# Patient Record
Sex: FEMALE | Race: BLACK OR AFRICAN AMERICAN | NOT HISPANIC OR LATINO | Employment: UNEMPLOYED | URBAN - METROPOLITAN AREA
[De-identification: names, ages, dates, MRNs, and addresses within clinical notes are randomized per-mention and may not be internally consistent; named-entity substitution may affect disease eponyms.]

---

## 2017-01-25 ENCOUNTER — OFFICE VISIT (OUTPATIENT)
Dept: HEMATOLOGY/ONCOLOGY | Facility: CLINIC | Age: 72
End: 2017-01-25
Payer: MEDICARE

## 2017-01-25 ENCOUNTER — LAB VISIT (OUTPATIENT)
Dept: LAB | Facility: HOSPITAL | Age: 72
End: 2017-01-25
Attending: INTERNAL MEDICINE
Payer: MEDICARE

## 2017-01-25 VITALS
HEART RATE: 63 BPM | TEMPERATURE: 98 F | HEIGHT: 64 IN | SYSTOLIC BLOOD PRESSURE: 175 MMHG | BODY MASS INDEX: 48.03 KG/M2 | RESPIRATION RATE: 16 BRPM | WEIGHT: 281.31 LBS | DIASTOLIC BLOOD PRESSURE: 79 MMHG

## 2017-01-25 DIAGNOSIS — M81.0 OSTEOPOROSIS: Primary | ICD-10-CM

## 2017-01-25 DIAGNOSIS — I10 ESSENTIAL HYPERTENSION: ICD-10-CM

## 2017-01-25 DIAGNOSIS — M81.0 OSTEOPOROSIS: ICD-10-CM

## 2017-01-25 DIAGNOSIS — Z82.49 FAMILY HISTORY OF PREMATURE CORONARY ARTERY DISEASE: ICD-10-CM

## 2017-01-25 DIAGNOSIS — Z86.69 HISTORY OF GUILLAIN-BARRE SYNDROME: ICD-10-CM

## 2017-01-25 LAB
ALBUMIN SERPL BCP-MCNC: 3.6 G/DL
ALP SERPL-CCNC: 82 U/L
ALT SERPL W/O P-5'-P-CCNC: 16 U/L
ANION GAP SERPL CALC-SCNC: 10 MMOL/L
AST SERPL-CCNC: 21 U/L
BILIRUB SERPL-MCNC: 0.8 MG/DL
BUN SERPL-MCNC: 11 MG/DL
CALCIUM SERPL-MCNC: 9.8 MG/DL
CHLORIDE SERPL-SCNC: 108 MMOL/L
CO2 SERPL-SCNC: 28 MMOL/L
CREAT SERPL-MCNC: 0.7 MG/DL
EST. GFR  (AFRICAN AMERICAN): >60 ML/MIN/1.73 M^2
EST. GFR  (NON AFRICAN AMERICAN): >60 ML/MIN/1.73 M^2
GLUCOSE SERPL-MCNC: 129 MG/DL
POTASSIUM SERPL-SCNC: 3.8 MMOL/L
PROT SERPL-MCNC: 7 G/DL
SODIUM SERPL-SCNC: 146 MMOL/L

## 2017-01-25 PROCEDURE — 99213 OFFICE O/P EST LOW 20 MIN: CPT | Mod: PBBFAC,PO | Performed by: INTERNAL MEDICINE

## 2017-01-25 PROCEDURE — 80053 COMPREHEN METABOLIC PANEL: CPT

## 2017-01-25 PROCEDURE — 99204 OFFICE O/P NEW MOD 45 MIN: CPT | Mod: S$PBB,,, | Performed by: INTERNAL MEDICINE

## 2017-01-25 PROCEDURE — 36415 COLL VENOUS BLD VENIPUNCTURE: CPT

## 2017-01-25 PROCEDURE — 99999 PR PBB SHADOW E&M-EST. PATIENT-LVL III: CPT | Mod: PBBFAC,,, | Performed by: INTERNAL MEDICINE

## 2017-01-25 NOTE — MR AVS SNAPSHOT
[place] [facility]  [address] [place] [address] Drive [address]  [place] [address]  Phone: [phone]                  Sarai Bashir   2017 2:00 PM   Office Visit    Description:  Female : 1945   Provider:  Marika Edwards MD   Department:  [place] [facility]           Reason for Visit     Osteoporosis           Diagnoses this Visit        Comments    Osteoporosis    -  Primary     History of Guillain-[place] syndrome         Family history of premature coronary artery disease         Essential hypertension                To Do List           Future Appointments        Provider Department Dept Phone    2017 10:00 AM Alex Hernandez MD [facility] - Cardiology [phone]    3/13/2017 8:00 AM Alex Green Jr., MD [place] [facility] [phone]    2017 8:30 AM MD [facility] - Endo/Diabetes [phone]    2017 1:00 PM Marika Edwards MD [place] [facility] [phone]      Goals (5 Years of Data)     None      [facility]     [facility] On Call Nurse Care Line - 24/7 Assistance  Registered nurses in the [facility] On Call Center provide clinical advisement, health education, appointment booking, and other advisory services.  Call for this free service at [phone].             Medications           Message regarding Medications     Verify the changes and/or additions to your medication regime listed below are the same as discussed with your clinician today.  If any of these changes or additions are incorrect, please notify your healthcare provider.             Verify that the below list of medications is an accurate representation of the medications you are currently taking.  If none reported, the list may be blank. If incorrect, please contact your healthcare provider. Carry this list with you in case of emergency.           Current Medications     ascorbic acid (VITAMIN C) 500 MG tablet Take 500 mg by mouth once daily.   "    aspirin 81 mg Tab Take by mouth Daily. Every day    atorvastatin (LIPITOR) 20 MG tablet Take 1 tablet (20 mg total) by mouth once daily.    baclofen (LIORESAL) 10 MG tablet Take 1 tablet (10 mg total) by mouth 2 (two) times daily. Prn muscle spasm    brinzolamide (AZOPT) 1 % ophthalmic suspension 1 drop 2 (two) times daily.    BROMFENAC SODIUM (PROLENSA OPHT) Apply to eye 2 (two) times daily.    CALCIUM CARBONATE/VITAMIN D3 (VITAMIN D-3 ORAL) Take 2,000 Int'l Units by mouth.    CRANBERRY EXTRACT ORAL Take 1 capsule by mouth once daily.    cyanocobalamin (VITAMIN B-12) 500 MCG tablet 1 Tablet(s) Oral PRN Every other day.      econazole nitrate 1 % cream Apply to both soles and interdigital spaces BID    fish oil-omega-3 fatty acids 300-1,000 mg capsule Take 2 g by mouth once daily.    FLAXSEED ORAL Take by mouth.    folic acid (FOLVITE) 800 MCG tablet Take by mouth Daily. Every day    loteprednol (LOTEMAX) 0.5 % ophthalmic suspension 1 drop 4 (four) times daily.    metoprolol tartrate (LOPRESSOR) 50 MG tablet Take 1 tablet (50 mg total) by mouth 2 (two) times daily.    naproxen (EC-NAPROSYN) 500 MG EC tablet Take 1 tablet (500 mg total) by mouth 2 (two) times daily with meals.    tramadol (ULTRAM) 50 mg tablet Take 50 mg by mouth as needed for Pain.    VITAMIN B COMP AND VIT C NO.6 (VITAMIN B COMP WITH VIT C NO.6 ORAL) No Sig Provided    vitamin E 400 UNIT capsule Take 400 Units by mouth once daily.      amlodipine (NORVASC) 5 MG tablet Take 1 tablet (5 mg total) by mouth once daily.    oxybutynin (DITROPAN-XL) 5 MG TR24 Take 1 tablet (5 mg total) by mouth once daily. For overactive bladder           Clinical Reference Information           Vital Signs - Last Recorded  Most recent update: 1/25/2017  2:19 PM by Bree Almanza LPN    BP Pulse Temp Resp Ht Wt    (!) 175/79 63 98.1 °F (36.7 °C) 16 5' 4" (1.626 m) 127.6 kg (281 lb 4.9 oz)    BMI                48.29 kg/m2          Blood Pressure          Most Recent " Value    BP  (!)  175/79      Allergies as of 1/25/2017     Actonel [Risedronate]    Fosamax [Alendronate]      Immunizations Administered on Date of Encounter - 1/25/2017     None      [facility] Sign-Up     Activating your [facility] account is as easy as 1-2-3!     1) Visit [email or web address], select Sign Up Now, enter this activation code and your date of birth, then select Next.  [email or web address]  Expires: 1/26/2017  8:46 AM      2) Create a username and password to use when you visit [facility] in the future and select a security question in case you lose your password and select Next.    3) Enter your e-mail address and click Sign Up!    Additional Information  If you have questions, please e-mail [email or web address] or call [phone] to talk to our [facility] staff. Remember, [facility] is NOT to be used for urgent needs. For medical emergencies, dial 911.

## 2017-01-25 NOTE — PROGRESS NOTES
Subjective:       Patient ID: Sarai Bashir is a 71 y.o. female.    Chief Complaint: Osteoporosis (clear for prolia)    HPI:   Dx with osteoporosis, CVA, hx Guillian Keno dx in 7 years ago, follows with pcp only , was hospitalized almost 1 month with [facility]. ? Pheresis was planned.  CVA, had some cardiac exams and normal.taking cholesterol meds ,a nd has HTN    REVIEW OF SYSTEMS:     CONSTITUTIONAL: The patient denies any weight change. There is no apparent    change in appetite, fever, night sweats, headaches, fatigue, dizziness, or    weakness.      SKIN: Denies rash, issues with nails, non-healing sores, bleeding, blotching    skin or abnormal bruising. Denies new moles or changes to existing moles.      BREASTS: There is no swelling around breasts or nipple discharge.    EYES: Denies eye pain, blurred vision, swelling, redness or discharge.      ENT AND MOUTH:has abscess to teeth, delayed her prolia that waas suggestion  CARDIOVASCULAR: Denies chest pain, discomfort or palpitations. Denies neck    swelling or episodes of passing out.      RESPIRATORY: Denies cough, sputum production, blood in sputum, and denies    shortness of breath.      GI: Denies trouble swallowing, indigestion, heartburn, abdominal pain, nausea,    vomiting, diarrhea, altered bowel habits, blood in stool, discoloration of    stools, change in nature of stool, bloating, increased abdominal girth.      GENITOURINARY: No discharge. No pelvic pain or lumps. No rash around groin or  lesions. No urinary frequency, hesitation, painful urination or blood in    urine. Denies incontinence. No problems with intercourse.      MUSCULOSKELETAL: Denies neck or back pain. Denies weakness in arms or legs,    joint problems or distended inflamed veins in legs. Denies swelling or abnormal  glands.      NEUROLOGICAL: Denies tingling, numbness, altered mentation changes to nerve    function in the face, weakness to one or both of the body. Denies changes to     gait and denies multiple falls or accidents.      PHYSICAL EXAM:     Vitals:    01/25/17 1418   BP: (!) 175/79   Pulse: 63   Resp: 16   Temp: 98.1 °F (36.7 °C)       GENERAL: Comfortable looking patient. Patient is in no distress.  Awake, alert and oriented to time, person and place.  No anxiety, or agitation.      HEENT: Normal conjunctivae and eyelids. WNL.  PERRLA 3 to 4 mm. No icterus, no pallor, no congestion, and no discharge noted.     NECK:  Supple. Trachea is central.  No crepitus.  No JVD or masses.    RESPIRATORY:  No intercostal retractions.  No dullness to percussion.  Chest is clear to auscultation.  No rales, rhonchi or wheezes.  No crepitus.  Good air entry bilaterally.    CARDIOVASCULAR:  S1 and S2 are normally heard without murmurs or gallops.  All peripheral pulses are present.    ABDOMEN:  Normal abdomen.  No hepatosplenomegaly.  No free fluid.  Bowel sounds are present.  No hernia noted. No masses.  No rebound or tenderness.  No guarding or rigidity.  Umbilicus is midline.    LYMPHATICS:  No axillary, cervical, supraclavicular, submental, or inguinal lymphadenopathy.    SKIN/MUSCULOSKELETAL:  There is no evidence of excoriation marks or ecchmosis.  No rashes.  No cyanosis.  No clubbing.  No joint or skeletal deformities noted.  Normal range of motion.    NEUROLOGIC:  Higher functions are appropriate.  No cranial nerve deficits.  Normal denis.  Normal strength.  Motor and sensory functions are normal.  Deep tendon reflexes are normal.    GENITAL/RECTAL:  Exams are deferred.      Laboratory:     CBC:  Lab Results   Component Value Date    WBC 9.10 08/06/2016    RBC 4.50 08/06/2016    HGB 12.7 08/06/2016    HCT 38.4 08/06/2016    MCV 85 08/06/2016    MCH 28.2 08/06/2016    MCHC 33.0 08/06/2016    RDW 15.1 (H) 08/06/2016     08/06/2016    MPV 10.3 08/06/2016    GRAN 6.6 08/06/2016    GRAN 72.4 08/06/2016    LYMPH 1.9 08/06/2016    LYMPH 21.1 08/06/2016    MONO 0.5 08/06/2016    MONO 5.5  08/06/2016    EOS 0.1 08/06/2016    BASO 0.00 08/06/2016    EOSINOPHIL 0.7 08/06/2016    BASOPHIL 0.3 08/06/2016       BMP: BMP  Lab Results   Component Value Date     (H) 01/25/2017    K 3.8 01/25/2017     01/25/2017    CO2 28 01/25/2017    BUN 11 01/25/2017    CREATININE 0.7 01/25/2017    CALCIUM 9.8 01/25/2017    ANIONGAP 10 01/25/2017    ESTGFRAFRICA >60 01/25/2017    EGFRNONAA >60 01/25/2017       LFT:   Lab Results   Component Value Date    ALT 16 01/25/2017    AST 21 01/25/2017    ALKPHOS 82 01/25/2017    BILITOT 0.8 01/25/2017         Assessment/Plan:       1. Osteoporosis    2. History of Guillain-[place] syndrome    3. Family history of premature coronary artery disease    4. Essential hypertension        Will hold off prolia till cleared from dental . rtc4 months if dental work is completed, please call pt to check on when dental work is being complteed when preparing chart for next visit

## 2017-01-25 NOTE — LETTER
January 25, 2017      Saeed Middleton MD  [address]  [facility] [address]           [facility]  [address] [place] [address] Drive [address]  [facility] [address]  Phone: [phone]          Patient: Sarai Bashir   MR Number: [id number]   YOB: 1945   Date of Visit: 1/25/2017       Dear Dr. Saeed Middleton:    Thank you for referring Sarai Bashir to me for evaluation. Attached you will find relevant portions of my assessment and plan of care.    If you have questions, please do not hesitate to call me. I look forward to following Sarai Bashir along with you.    Sincerely,    Marika Edwards MD    Enclosure  CC:  No Recipients    If you would like to receive this communication electronically, please contact [email or web address] or [phone] to request more information on [employer] Link access.    For providers and/or their staff who would like to refer a patient to [facility], please contact us through our one-stop-shop provider referral line, [facility], [place], at [phone].    If you feel you have received this communication in error or would no longer like to receive these types of communications, please e-mail [email or web address]

## 2017-01-26 DIAGNOSIS — M81.0 OSTEOPOROSIS: Primary | ICD-10-CM

## 2017-01-31 ENCOUNTER — OFFICE VISIT (OUTPATIENT)
Dept: CARDIOLOGY | Facility: CLINIC | Age: 72
End: 2017-01-31
Payer: MEDICARE

## 2017-01-31 VITALS
DIASTOLIC BLOOD PRESSURE: 74 MMHG | OXYGEN SATURATION: 99 % | HEART RATE: 63 BPM | SYSTOLIC BLOOD PRESSURE: 129 MMHG | BODY MASS INDEX: 48.32 KG/M2 | WEIGHT: 283.06 LBS | HEIGHT: 64 IN

## 2017-01-31 DIAGNOSIS — E78.00 PURE HYPERCHOLESTEROLEMIA: ICD-10-CM

## 2017-01-31 DIAGNOSIS — Z91.89 CARDIOVASCULAR RISK FACTOR: ICD-10-CM

## 2017-01-31 DIAGNOSIS — E66.01 MORBID OBESITY DUE TO EXCESS CALORIES: ICD-10-CM

## 2017-01-31 DIAGNOSIS — I63.132 CEREBRAL INFARCTION DUE TO EMBOLISM OF LEFT CAROTID ARTERY: Primary | ICD-10-CM

## 2017-01-31 PROCEDURE — 99999 PR PBB SHADOW E&M-EST. PATIENT-LVL III: CPT | Mod: PBBFAC,,, | Performed by: INTERNAL MEDICINE

## 2017-01-31 PROCEDURE — 99214 OFFICE O/P EST MOD 30 MIN: CPT | Mod: S$PBB,,, | Performed by: INTERNAL MEDICINE

## 2017-01-31 PROCEDURE — 99213 OFFICE O/P EST LOW 20 MIN: CPT | Mod: PBBFAC,PO | Performed by: INTERNAL MEDICINE

## 2017-01-31 RX ORDER — ATORVASTATIN CALCIUM 40 MG/1
40 TABLET, FILM COATED ORAL DAILY
Qty: 90 TABLET | Refills: 3 | Status: SHIPPED | OUTPATIENT
Start: 2017-01-31 | End: 2018-05-17 | Stop reason: SDUPTHER

## 2017-01-31 NOTE — MR AVS SNAPSHOT
[facility] - Cardiology  [address]  [facility] [address]  Phone: [phone]                  Sarai Bashir   2017 10:00 AM   Office Visit    Description:  Female : 1945   Provider:  Alex Hernandez MD   Department:  [facility] - Cardiology           Reason for Visit     Follow-up           Diagnoses this Visit        Comments    Cerebral infarction due to embolism of left carotid artery    -  Primary     Morbid obesity due to excess calories         Pure hypercholesterolemia         Cardiovascular risk factor                To Do List           Future Appointments        Provider Department Dept Phone    3/13/2017 8:00 AM MD [facility] - Family Medicine [phone]    2017 8:30 AM [facility] - Lab [phone]    2017 11:30 AM [facility] [phone]    2017 8:30 AM MD [facility] - Endo/Diabetes [phone]    2017 10:00 AM [facility] - Lab [phone]      Goals (5 Years of Data)     None      Follow-Up and Disposition     Return in about 3 months (around 2017) for lipid panel, hsCRP.    Follow-up and Disposition History       These Medications        Disp Refills Start End    atorvastatin (LIPITOR) 40 MG tablet 90 tablet 3 2017    Take 1 tablet (40 mg total) by mouth once daily. - Oral    Pharmacy: [facility] [address] #: [phone]         [facility] On Call     [facility] On Call Nurse Care Line -  Assistance  Registered nurses in the [facility] On Call Center provide clinical advisement, health education, appointment booking, and other advisory services.  Call for this free service at [phone].             Medications           Message regarding Medications     Verify the changes and/or additions to your medication regime listed below are the same as discussed with your  clinician today.  If any of these changes or additions are incorrect, please notify your healthcare provider.        CHANGE how you are taking these medications     Start Taking Instead of    atorvastatin (LIPITOR) 40 MG tablet atorvastatin (LIPITOR) 20 MG tablet    Dosage:  Take 1 tablet (40 mg total) by mouth once daily. Dosage:  Take 1 tablet (20 mg total) by mouth once daily.    Reason for Change:  Reorder            Verify that the below list of medications is an accurate representation of the medications you are currently taking.  If none reported, the list may be blank. If incorrect, please contact your healthcare provider. Carry this list with you in case of emergency.           Current Medications     ascorbic acid (VITAMIN C) 500 MG tablet Take 500 mg by mouth once daily.      aspirin 81 mg Tab Take by mouth Daily. Every day    atorvastatin (LIPITOR) 40 MG tablet Take 1 tablet (40 mg total) by mouth once daily.    brinzolamide (AZOPT) 1 % ophthalmic suspension 1 drop 2 (two) times daily.    BROMFENAC SODIUM (PROLENSA OPHT) Apply to eye 2 (two) times daily.    CALCIUM CARBONATE/VITAMIN D3 (VITAMIN D-3 ORAL) Take 2,000 Int'l Units by mouth.    CRANBERRY EXTRACT ORAL Take 1 capsule by mouth once daily.    cyanocobalamin (VITAMIN B-12) 500 MCG tablet 1 Tablet(s) Oral PRN Every other day.      econazole nitrate 1 % cream Apply to both soles and interdigital spaces BID    fish oil-omega-3 fatty acids 300-1,000 mg capsule Take 2 g by mouth once daily.    FLAXSEED ORAL Take by mouth.    folic acid (FOLVITE) 800 MCG tablet Take by mouth Daily. Every day    loteprednol (LOTEMAX) 0.5 % ophthalmic suspension 1 drop 4 (four) times daily.    metoprolol tartrate (LOPRESSOR) 50 MG tablet Take 1 tablet (50 mg total) by mouth 2 (two) times daily.    naproxen (EC-NAPROSYN) 500 MG EC tablet Take 1 tablet (500 mg total) by mouth 2 (two) times daily with meals.    VITAMIN B COMP AND VIT C NO.6 (VITAMIN B COMP WITH VIT C NO.6  "ORAL) No Sig Provided    vitamin E 400 UNIT capsule Take 400 Units by mouth once daily.      amlodipine (NORVASC) 5 MG tablet Take 1 tablet (5 mg total) by mouth once daily.    baclofen (LIORESAL) 10 MG tablet Take 1 tablet (10 mg total) by mouth 2 (two) times daily. Prn muscle spasm    oxybutynin (DITROPAN-XL) 5 MG TR24 Take 1 tablet (5 mg total) by mouth once daily. For overactive bladder    tramadol (ULTRAM) 50 mg tablet Take 50 mg by mouth as needed for Pain.           Clinical Reference Information           Vital Signs - Last Recorded  Most recent update: 1/31/2017 10:27 AM by Anentte Castillo LPN    BP Pulse Ht Wt SpO2 BMI    129/74 (BP Location: Left arm, Patient Position: Sitting) 63 5' 4" (1.626 m) 128.4 kg (283 lb 1.1 oz) 99% 48.59 kg/m2      Blood Pressure          Most Recent Value    Right Arm BP - Sitting  129/60    Left Arm BP - Sitting  129/74    BP  129/74      Allergies as of 1/31/2017     Actonel [Risedronate]    Fosamax [Alendronate]      Immunizations Administered on Date of Encounter - 1/31/2017     None      Orders Placed During Today's Visit     Future Labs/Procedures Expected by Expires    High sensitivity CRP (Cardiac CRP)  4/30/2017 1/31/2018    Lipid panel  4/30/2017 4/1/2018      [facility] Sign-Up     Activating your [facility] account is as easy as 1-2-3!     1) Visit [email or web address], select Sign Up Now, enter this activation code and your date of birth, then select Next.  [email or web address]  Expires: 3/17/2017 11:08 AM      2) Create a username and password to use when you visit [facility] in the future and select a security question in case you lose your password and select Next.    3) Enter your e-mail address and click Sign Up!    Additional Information  If you have questions, please e-mail [email or web address] or call [phone] to talk to our [facility] staff. Remember, [facility] is NOT to be used for urgent needs. For medical emergencies, dial 911.         "
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
## 2017-02-23 DIAGNOSIS — I10 HTN (HYPERTENSION): ICD-10-CM

## 2017-02-23 RX ORDER — METOPROLOL TARTRATE 50 MG/1
TABLET ORAL
Qty: 60 TABLET | Refills: 11 | Status: SHIPPED | OUTPATIENT
Start: 2017-02-23 | End: 2017-03-13 | Stop reason: SDUPTHER

## 2017-03-06 ENCOUNTER — DOCUMENTATION ONLY (OUTPATIENT)
Dept: FAMILY MEDICINE | Facility: CLINIC | Age: 72
End: 2017-03-06

## 2017-03-06 NOTE — PROGRESS NOTES
Pre-Visit Chart Review  For Appointment Scheduled on (date) 3/13/17    Health Maintenance Due   Topic Date Due    Hepatitis C Screening  1945    TETANUS VACCINE  03/23/1965    Pneumococcal (65+) (1 of 2 - PCV13) 03/23/2012    Influenza Vaccine  08/01/2016

## 2017-03-13 ENCOUNTER — OFFICE VISIT (OUTPATIENT)
Dept: FAMILY MEDICINE | Facility: CLINIC | Age: 72
End: 2017-03-13
Payer: MEDICARE

## 2017-03-13 VITALS
HEART RATE: 78 BPM | WEIGHT: 280.88 LBS | BODY MASS INDEX: 47.95 KG/M2 | DIASTOLIC BLOOD PRESSURE: 84 MMHG | HEIGHT: 64 IN | TEMPERATURE: 98 F | SYSTOLIC BLOOD PRESSURE: 144 MMHG

## 2017-03-13 DIAGNOSIS — Z01.419 WELL WOMAN EXAM: ICD-10-CM

## 2017-03-13 DIAGNOSIS — I10 ESSENTIAL HYPERTENSION: Primary | ICD-10-CM

## 2017-03-13 DIAGNOSIS — N32.81 OVERACTIVE BLADDER: ICD-10-CM

## 2017-03-13 DIAGNOSIS — E78.00 PURE HYPERCHOLESTEROLEMIA: ICD-10-CM

## 2017-03-13 DIAGNOSIS — G47.33 OSA (OBSTRUCTIVE SLEEP APNEA): ICD-10-CM

## 2017-03-13 DIAGNOSIS — E66.01 MORBID OBESITY DUE TO EXCESS CALORIES: ICD-10-CM

## 2017-03-13 PROCEDURE — 99213 OFFICE O/P EST LOW 20 MIN: CPT | Mod: S$PBB,,, | Performed by: FAMILY MEDICINE

## 2017-03-13 PROCEDURE — 99213 OFFICE O/P EST LOW 20 MIN: CPT | Mod: PBBFAC,PO | Performed by: FAMILY MEDICINE

## 2017-03-13 PROCEDURE — 99999 PR PBB SHADOW E&M-EST. PATIENT-LVL III: CPT | Mod: PBBFAC,,, | Performed by: FAMILY MEDICINE

## 2017-03-13 RX ORDER — NYSTATIN 100000 U/G
CREAM TOPICAL 2 TIMES DAILY
Qty: 30 G | Refills: 5 | Status: SHIPPED | OUTPATIENT
Start: 2017-03-13

## 2017-03-13 RX ORDER — AMLODIPINE BESYLATE 5 MG/1
5 TABLET ORAL DAILY
Qty: 90 TABLET | Refills: 3 | Status: SHIPPED | OUTPATIENT
Start: 2017-03-13 | End: 2017-07-14 | Stop reason: SDUPTHER

## 2017-03-13 RX ORDER — METOPROLOL TARTRATE 50 MG/1
50 TABLET ORAL 2 TIMES DAILY
Qty: 180 TABLET | Refills: 3 | Status: SHIPPED | OUTPATIENT
Start: 2017-03-13 | End: 2018-03-21 | Stop reason: SDUPTHER

## 2017-03-13 RX ORDER — OXYBUTYNIN CHLORIDE 5 MG/1
5 TABLET, EXTENDED RELEASE ORAL DAILY
Qty: 90 TABLET | Refills: 3 | Status: SHIPPED | OUTPATIENT
Start: 2017-03-13 | End: 2017-05-15 | Stop reason: SDUPTHER

## 2017-03-13 NOTE — MR AVS SNAPSHOT
[place] [facility]  [address]  [place] [address]  Phone: [phone]  Fax: [phone]                  Sarai Bashir   3/13/2017 8:00 AM   Office Visit    Description:  Female : 1945   Provider:  Alex Green Jr., MD   Department:  [facility]           Diagnoses this Visit        Comments    Essential hypertension    -  Primary     Pure hypercholesterolemia         Morbid obesity due to excess calories         KENTRELL (obstructive sleep apnea)         Overactive bladder         Well woman exam                To Do List           Future Appointments        Provider Department Dept Phone    2017 8:30 AM LAB, [facility] - [facility] [phone]    2017 11:30 AM Alex Hernandez MD [facility] - Cardiology [phone]    2017 10:40 AM MD [facility] - OB/ -[phone]    2017 8:30 AM Saeed Middleton MD [place] [facility]/Diabetes [phone]    2017 10:00 AM ELIAZAR, [facility] - Lab [phone]      Goals (5 Years of Data)     None      Follow-Up and Disposition     Return in about 6 months (around 2017).       These Medications        Disp Refills Start End    metoprolol tartrate (LOPRESSOR) 50 MG tablet 180 tablet 3 3/13/2017     Take 1 tablet (50 mg total) by mouth 2 (two) times daily. - Oral    Pharmacy: [facility] Ph #: [phone]       amlodipine (NORVASC) 5 MG tablet 90 tablet 3 3/13/2017 3/13/2018    Take 1 tablet (5 mg total) by mouth once daily. - Oral    Pharmacy: [facility] Ph #: [phone]       oxybutynin (DITROPAN-XL) 5 MG TR24 90 tablet 3 3/13/2017 3/13/2018    Take 1 tablet (5 mg total) by mouth once daily. For overactive bladder - Oral    [facility]: [phone]       nystatin (MYCOSTATIN) cream 30 g 5  3/13/2017     Apply topically 2 (two) times daily. - Topical (Top)    Pharmacy: [facility] [address] [place] [address], [place] [address]  #: [phone]         [facility] On Call     [facility] On Call Nurse Care Line - 24/7 Assistance  Registered nurses in the [facility] provide clinical advisement, health education, appointment booking, and other advisory services.  Call for this free service at [phone].             Medications           Message regarding Medications     Verify the changes and/or additions to your medication regime listed below are the same as discussed with your clinician today.  If any of these changes or additions are incorrect, please notify your healthcare provider.        START taking these NEW medications        Refills    nystatin (MYCOSTATIN) cream 5    Sig: Apply topically 2 (two) times daily.    Class: Normal    Route: Topical (Top)           Verify that the below list of medications is an accurate representation of the medications you are currently taking.  If none reported, the list may be blank. If incorrect, please contact your healthcare provider. Carry this list with you in case of emergency.           Current Medications     amlodipine (NORVASC) 5 MG tablet Take 1 tablet (5 mg total) by mouth once daily.    ascorbic acid (VITAMIN C) 500 MG tablet Take 500 mg by mouth once daily.      aspirin 81 mg Tab Take by mouth Daily. Every day    atorvastatin (LIPITOR) 40 MG tablet Take 1 tablet (40 mg total) by mouth once daily.    baclofen (LIORESAL) 10 MG tablet Take 1 tablet (10 mg total) by mouth 2 (two) times daily. Prn muscle spasm    brinzolamide (AZOPT) 1 % ophthalmic suspension 1 drop 2 (two) times daily.    BROMFENAC SODIUM (PROLENSA OPHT) Apply to eye 2 (two) times daily.    CALCIUM CARBONATE/VITAMIN D3 (VITAMIN D-3 ORAL) Take 2,000 Int'l Units by mouth.    CRANBERRY EXTRACT ORAL Take 1 capsule by mouth once daily.    cyanocobalamin (VITAMIN B-12) 500 MCG tablet 1  "Tablet(s) Oral PRN Every other day.      econazole nitrate 1 % cream Apply to both soles and interdigital spaces BID    fish oil-omega-3 fatty acids 300-1,000 mg capsule Take 2 g by mouth once daily.    FLAXSEED ORAL Take by mouth.    folic acid (FOLVITE) 800 MCG tablet Take by mouth Daily. Every day    loteprednol (LOTEMAX) 0.5 % ophthalmic suspension 1 drop 4 (four) times daily.    metoprolol tartrate (LOPRESSOR) 50 MG tablet Take 1 tablet (50 mg total) by mouth 2 (two) times daily.    naproxen (EC-NAPROSYN) 500 MG EC tablet Take 1 tablet (500 mg total) by mouth 2 (two) times daily with meals.    oxybutynin (DITROPAN-XL) 5 MG TR24 Take 1 tablet (5 mg total) by mouth once daily. For overactive bladder    tramadol (ULTRAM) 50 mg tablet Take 50 mg by mouth as needed for Pain.    VITAMIN B COMP AND VIT C NO.6 (VITAMIN B COMP WITH VIT C NO.6 ORAL) No Sig Provided    vitamin E 400 UNIT capsule Take 400 Units by mouth once daily.      nystatin (MYCOSTATIN) cream Apply topically 2 (two) times daily.           Clinical Reference Information           Your Vitals Were     BP Pulse Temp Height Weight BMI    144/84 (BP Method: Manual) 78 98.4 °F (36.9 °C) (Oral) 5' 4" (1.626 m) 127.4 kg (280 lb 13.9 oz) 48.21 kg/m2      Blood Pressure          Most Recent Value    BP  (!)  144/84      Allergies as of 3/13/2017     Actonel [Risedronate]    Fosamax [Alendronate]      Immunizations Administered on Date of Encounter - 3/13/2017     None      Orders Placed During Today's Visit      Normal Orders This Visit    Ambulatory referral to Obstetrics / Gynecology       [facility] Sign-Up     Activating your [facility] account is as easy as 1-2-3!     1) Visit [email or web address], select Sign Up Now, enter this activation code and your date of birth, then select Next.  [email or web address]  Expires: 3/17/2017 12:08 PM      2) Create a username and password to use when you visit [facility] in the future and select a security question in case you lose " your password and select Next.    3) Enter your e-mail address and click Sign Up!    Additional Information  If you have questions, please e-mail [email or web address] or call [phone] to talk to our [facility] staff. Remember, [facility] is NOT to be used for urgent needs. For medical emergencies, dial 911.         Instructions      Weight Management: Getting Started  Healthy bodies come in all shapes and sizes. Not all bodies are made to be thin. For some people, a healthy weight is higher than the average weight listed on weight charts. Your healthcare provider can help you decide on a healthy weight for you.    Reasons to lose weight  Losing weight can help with some health problems, such as high blood pressure, heart disease, diabetes, sleep apnea, and arthritis. You may also feel more energy.  Set your long-term goal  Your goal doesn't even have to be a specific weight. You may decide on a fitness goal (such as being able to walk 10 miles a week), or a health goal (such as lowering your blood pressure). Choose a goal that is measurable and reasonable, so you know when you've reached it. A goal of reaching a BMI of less than 25 is not always reasonable (or possible).   Make an action plan  Habits dont change overnight. Setting your goals too high can leave you feeling discouraged if you cant reach them. Be realistic. Choose one or two small changes you can make now. Set an action plan for how you are going to make these changes. When you can stick to this plan, keep making a few more small changes. Taking small steps will help you stay on the path to success.  Track your progress  Write down your goals. Then, keep a daily record of your progress. Write down what you eat and how active you are. This record lets you look back on how much youve done. It may also help when youre feeling frustrated. Reward yourself for success. Even if you dont reach every goal, give yourself credit for what you do get  done.  Get support  Encouragement from others can help make losing weight easier. Ask your family members and friends for support. They may even want to join you. Also look to your healthcare provider, registered dietitian, and  for help. Your local hospital can give you more information about nutrition, exercise, and weight loss.  Date Last Reviewed: 1/31/2016 © 2000-2016 [employer]. [address] [place] [address], [place] [address]. All rights reserved. This information is not intended as a substitute for professional medical care. Always follow your healthcare professional's instructions.        Walking for Fitness  Fitness walking has something for everyone, even people who are already fit. Walking is one of the safest ways to condition your body aerobically. It can boost energy, help you lose weight, and reduce stress.    Physical benefits  · Walking strengthens your heart and lungs, and tones your muscles.  · When walking, your feet land with less impact than in other sports. This reduces chances of muscle, bone, and joint injury.  · Regular walking improves your cholesterol levels and lowers your risk of heart disease. And it helps you control your blood sugar if you have diabetes.  · Walking is a weight-bearing activity, which helps maintain bone density. This can help prevent osteoporosis.  Personal rewards  · Taking walks can help you relax and manage stress. And fitness walking may make you feel better about yourself.  · Walking can help you sleep better at night and make you less likely to be depressed.  · Regular walking may help maintain your memory as you get older.  · Walking is a great way to spend extra time with friends and family members. Be sure to invite your dog along!  Q&A about fitness walking  Q: Will walking keep me fit?  A: Yes. Regular walking at the right pace gives you all the benefits of other aerobic activities, such as jogging and swimming.  Q: Will  walking help me lose weight and keep it off?  A: Yes. Per mile, walking can burn as many calories as jogging. Your health care provider can help work walking into your weight-loss plan.  Q: Is walking safe for my health?  A: Yes. Walking is safe if you have high blood pressure, diabetes, heart disease, or other conditions. Talk to your health care provider before you start.  Date Last Reviewed: 5/9/2015 © 2000-2016 [employer]. [address] [place] [address]. All rights reserved. This information is not intended as a substitute for professional medical care. Always follow your healthcare professional's instructions.        Controlling High Blood Pressure  High blood pressure (hypertension) is often called the silent killer. This is because many people who have it dont know it. High blood pressure is defined as 140/90 mm Hg or higher. Know your blood pressure and remember to check it regularly. Doing so can save your life. Here are some things you can do to help control your blood pressure.    Choose heart-healthy foods  · Select low-salt, low-fat foods. Limit sodium intake to 2,400 mg per day or the amount suggested by your healthcare provider.  · Limit canned, dried, cured, packaged, and fast foods. These can contain a lot of salt.  · Eat 8 to 10 servings of fruits and vegetables every day.  · Choose lean meats, fish, or chicken.  · Eat whole-grain pasta, brown rice, and beans.  · Eat 2 to 3 servings of low-fat or fat-free dairy products.  · Ask your doctor about the DASH eating plan. This plan helps reduce blood pressure.  · When you go to a restaurant, ask that your meal be prepared with no added salt.  Maintain a healthy weight  · Ask your healthcare provider how many calories to eat a day. Then stick to that number.  · Ask your healthcare provider what weight range is healthiest for you. If you are overweight, a weight loss of only 3% to 5% of your body weight can help lower blood  pressure. Generally, a good weight loss goal is to lose 10% of your body weight in a year.  · Limit snacks and sweets.  · Get regular exercise.  Get up and get active  · Choose activities you enjoy. Find ones you can do with friends or family. This includes bicycling, dancing, walking, and jogging.  · Park farther away from building entrances.  · Use stairs instead of the elevator.  · When you can, walk or bike instead of driving.  · [place] leaves, garden, or do household repairs.  · Be active at a moderate to vigorous level of physical activity for at least 40 minutes for a minimum of 3 to 4 days a week.   Manage stress  · Make time to relax and enjoy life. Find time to laugh.  · Communicate your concerns with your loved ones and your healthcare provider.  · Visit with family and friends, and keep up with hobbies.  Limit alcohol and quit smoking  · Men should have no more than 2 drinks per day.  · Women should have no more than 1 drink per day.  · Talk with your healthcare provider about quitting smoking. Smoking significantly increases your risk for heart disease and stroke. Ask your healthcare provider about community smoking cessation programs and other options.  Medicines  If lifestyle changes arent enough, your healthcare provider may prescribe high blood pressure medicine. Take all medicines as prescribed. If you have any questions about your medicines, ask your healthcare provider before stopping or changing them.   Date Last Reviewed: 4/27/2016  © 1122-0812 [employer]. [address] [place] [address]. All rights reserved. This information is not intended as a substitute for professional medical care. Always follow your healthcare professional's instructions.             Language Assistance Services     ATTENTION: Language assistance services are available, free of charge. Please call [phone].      ATENCIÓN: Si habla español, tiene a mcintyre disposición servicios gratuitos de  asistencia lingüística. Matthew sanchez [phone].     SHRUTHI Ý: N?u b?n nói Ti?ng Vi?t, có các d?ch v? h? tr? ngôn ng? mi?n phí dành cho b?n. G?i s? [phone].         [facility] complies with applicable Federal civil rights laws and does not discriminate on the basis of race, color, national origin, age, disability, or sex.

## 2017-03-13 NOTE — PATIENT INSTRUCTIONS
Weight Management: Getting Started  Healthy bodies come in all shapes and sizes. Not all bodies are made to be thin. For some people, a healthy weight is higher than the average weight listed on weight charts. Your healthcare provider can help you decide on a healthy weight for you.    Reasons to lose weight  Losing weight can help with some health problems, such as high blood pressure, heart disease, diabetes, sleep apnea, and arthritis. You may also feel more energy.  Set your long-term goal  Your goal doesn't even have to be a specific weight. You may decide on a fitness goal (such as being able to walk 10 miles a week), or a health goal (such as lowering your blood pressure). Choose a goal that is measurable and reasonable, so you know when you've reached it. A goal of reaching a BMI of less than 25 is not always reasonable (or possible).   Make an action plan  Habits dont change overnight. Setting your goals too high can leave you feeling discouraged if you cant reach them. Be realistic. Choose one or two small changes you can make now. Set an action plan for how you are going to make these changes. When you can stick to this plan, keep making a few more small changes. Taking small steps will help you stay on the path to success.  Track your progress  Write down your goals. Then, keep a daily record of your progress. Write down what you eat and how active you are. This record lets you look back on how much youve done. It may also help when youre feeling frustrated. Reward yourself for success. Even if you dont reach every goal, give yourself credit for what you do get done.  Get support  Encouragement from others can help make losing weight easier. Ask your family members and friends for support. They may even want to join you. Also look to your healthcare provider, registered dietitian, and  for help. Your local hospital can give you more information about nutrition, exercise, and  weight loss.  Date Last Reviewed: 1/31/2016 © 2000-2016 [employer]. [address] [place] [address], [place] [address]. All rights reserved. This information is not intended as a substitute for professional medical care. Always follow your healthcare professional's instructions.        Walking for Fitness  Fitness walking has something for everyone, even people who are already fit. Walking is one of the safest ways to condition your body aerobically. It can boost energy, help you lose weight, and reduce stress.    Physical benefits  · Walking strengthens your heart and lungs, and tones your muscles.  · When walking, your feet land with less impact than in other sports. This reduces chances of muscle, bone, and joint injury.  · Regular walking improves your cholesterol levels and lowers your risk of heart disease. And it helps you control your blood sugar if you have diabetes.  · Walking is a weight-bearing activity, which helps maintain bone density. This can help prevent osteoporosis.  Personal rewards  · Taking walks can help you relax and manage stress. And fitness walking may make you feel better about yourself.  · Walking can help you sleep better at night and make you less likely to be depressed.  · Regular walking may help maintain your memory as you get older.  · Walking is a great way to spend extra time with friends and family members. Be sure to invite your dog along!  Q&A about fitness walking  Q: Will walking keep me fit?  A: Yes. Regular walking at the right pace gives you all the benefits of other aerobic activities, such as jogging and swimming.  Q: Will walking help me lose weight and keep it off?  A: Yes. Per mile, walking can burn as many calories as jogging. Your health care provider can help work walking into your weight-loss plan.  Q: Is walking safe for my health?  A: Yes. Walking is safe if you have high blood pressure, diabetes, heart disease, or other conditions. Talk to your health  care provider before you start.  Date Last Reviewed: 5/9/2015  © 7321-5648 [employer]. [address] [place] [address], [place] [address]. All rights reserved. This information is not intended as a substitute for professional medical care. Always follow your healthcare professional's instructions.        Controlling High Blood Pressure  High blood pressure (hypertension) is often called the silent killer. This is because many people who have it dont know it. High blood pressure is defined as 140/90 mm Hg or higher. Know your blood pressure and remember to check it regularly. Doing so can save your life. Here are some things you can do to help control your blood pressure.    Choose heart-healthy foods  · Select low-salt, low-fat foods. Limit sodium intake to 2,400 mg per day or the amount suggested by your healthcare provider.  · Limit canned, dried, cured, packaged, and fast foods. These can contain a lot of salt.  · Eat 8 to 10 servings of fruits and vegetables every day.  · Choose lean meats, fish, or chicken.  · Eat whole-grain pasta, brown rice, and beans.  · Eat 2 to 3 servings of low-fat or fat-free dairy products.  · Ask your doctor about the DASH eating plan. This plan helps reduce blood pressure.  · When you go to a restaurant, ask that your meal be prepared with no added salt.  Maintain a healthy weight  · Ask your healthcare provider how many calories to eat a day. Then stick to that number.  · Ask your healthcare provider what weight range is healthiest for you. If you are overweight, a weight loss of only 3% to 5% of your body weight can help lower blood pressure. Generally, a good weight loss goal is to lose 10% of your body weight in a year.  · Limit snacks and sweets.  · Get regular exercise.  Get up and get active  · Choose activities you enjoy. Find ones you can do with friends or family. This includes bicycling, dancing, walking, and jogging.  · Park farther away from building  entrances.  · Use stairs instead of the elevator.  · When you can, walk or bike instead of driving.  · Cut Off leaves, garden, or do household repairs.  · Be active at a moderate to vigorous level of physical activity for at least 40 minutes for a minimum of 3 to 4 days a week.   Manage stress  · Make time to relax and enjoy life. Find time to laugh.  · Communicate your concerns with your loved ones and your healthcare provider.  · Visit with family and friends, and keep up with hobbies.  Limit alcohol and quit smoking  · Men should have no more than 2 drinks per day.  · Women should have no more than 1 drink per day.  · Talk with your healthcare provider about quitting smoking. Smoking significantly increases your risk for heart disease and stroke. Ask your healthcare provider about community smoking cessation programs and other options.  Medicines  If lifestyle changes arent enough, your healthcare provider may prescribe high blood pressure medicine. Take all medicines as prescribed. If you have any questions about your medicines, ask your healthcare provider before stopping or changing them.   Date Last Reviewed: 4/27/2016 © 2000-2016 [employer]. [address] [place] [address], [place] [address]. All rights reserved. This information is not intended as a substitute for professional medical care. Always follow your healthcare professional's instructions.
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
## 2017-05-05 ENCOUNTER — TELEPHONE (OUTPATIENT)
Dept: OBSTETRICS AND GYNECOLOGY | Facility: CLINIC | Age: 72
End: 2017-05-05

## 2017-05-05 ENCOUNTER — OFFICE VISIT (OUTPATIENT)
Dept: OBSTETRICS AND GYNECOLOGY | Facility: CLINIC | Age: 72
End: 2017-05-05
Payer: MEDICARE

## 2017-05-05 ENCOUNTER — TELEPHONE (OUTPATIENT)
Dept: FAMILY MEDICINE | Facility: CLINIC | Age: 72
End: 2017-05-05

## 2017-05-05 ENCOUNTER — LAB VISIT (OUTPATIENT)
Dept: LAB | Facility: HOSPITAL | Age: 72
End: 2017-05-05
Attending: INTERNAL MEDICINE
Payer: MEDICARE

## 2017-05-05 VITALS
SYSTOLIC BLOOD PRESSURE: 187 MMHG | HEART RATE: 71 BPM | DIASTOLIC BLOOD PRESSURE: 89 MMHG | BODY MASS INDEX: 48.5 KG/M2 | WEIGHT: 284.06 LBS | HEIGHT: 64 IN

## 2017-05-05 DIAGNOSIS — E78.00 PURE HYPERCHOLESTEROLEMIA: ICD-10-CM

## 2017-05-05 DIAGNOSIS — Z91.89 CARDIOVASCULAR RISK FACTOR: ICD-10-CM

## 2017-05-05 DIAGNOSIS — N64.4 PAIN OF RIGHT BREAST: Primary | ICD-10-CM

## 2017-05-05 DIAGNOSIS — I63.132 CEREBRAL INFARCTION DUE TO EMBOLISM OF LEFT CAROTID ARTERY: ICD-10-CM

## 2017-05-05 LAB
CHOLEST/HDLC SERPL: 2.1 {RATIO}
CRP SERPL-MCNC: 1.09 MG/L
HDL/CHOLESTEROL RATIO: 48.3 %
HDLC SERPL-MCNC: 145 MG/DL
HDLC SERPL-MCNC: 70 MG/DL
LDLC SERPL CALC-MCNC: 49.8 MG/DL
NONHDLC SERPL-MCNC: 75 MG/DL
TRIGL SERPL-MCNC: 126 MG/DL

## 2017-05-05 PROCEDURE — 36415 COLL VENOUS BLD VENIPUNCTURE: CPT

## 2017-05-05 PROCEDURE — 99213 OFFICE O/P EST LOW 20 MIN: CPT | Mod: S$PBB,,, | Performed by: OBSTETRICS & GYNECOLOGY

## 2017-05-05 PROCEDURE — 86141 C-REACTIVE PROTEIN HS: CPT

## 2017-05-05 PROCEDURE — 80061 LIPID PANEL: CPT

## 2017-05-05 PROCEDURE — 99213 OFFICE O/P EST LOW 20 MIN: CPT | Mod: PBBFAC,PO | Performed by: OBSTETRICS & GYNECOLOGY

## 2017-05-05 PROCEDURE — 99999 PR PBB SHADOW E&M-EST. PATIENT-LVL III: CPT | Mod: PBBFAC,,, | Performed by: OBSTETRICS & GYNECOLOGY

## 2017-05-05 NOTE — TELEPHONE ENCOUNTER
I would use the Aleve only as necessary as it can affect the kidneys. The other med is OK as far as I can tell.

## 2017-05-05 NOTE — TELEPHONE ENCOUNTER
----- Message from Xiomara Chowdary sent at 5/5/2017  1:23 PM CDT -----  Contact: self [phone]  Patient came in to see if it's okay for her to take these medicines that her eye doctor prescribed her, She said they are just eye vitamins. But she has concerns about it and she wants to ask Dr Green, her Primary care physician before she takes them. (Aleve 440 mg ,Boswellia extract and 5-LOX Inhibitor with Apres Flex). Please call the patient at [phone].

## 2017-05-05 NOTE — LETTER
May 9, 2017      Alex Green Jr., MD  [address] [facility] [address]           [facility] - OB/ GYN  [address] [place] [address] [address]  [facility] [address]  Phone: [phone]          Patient: Sarai Bashir   MR Number: [id number]   YOB: 1945   Date of Visit: 5/5/2017       Dear Dr. Alex Green Jr.:    Thank you for referring Sarai Bashir to me for evaluation. Attached you will find relevant portions of my assessment and plan of care.    If you have questions, please do not hesitate to call me. I look forward to following Sarai Bashir along with you.    Sincerely,    Jada Emanuel MD    Enclosure  CC:  No Recipients    If you would like to receive this communication electronically, please contact [email or web address] or [phone] to request more information on [employer] Link access.    For providers and/or their staff who would like to refer a patient to [facility], please contact us through our one-stop-shop provider referral line, [facility], at [phone].    If you feel you have received this communication in error or would no longer like to receive these types of communications, please e-mail [email or web address]

## 2017-05-09 NOTE — PROGRESS NOTES
Subjective:       Patient ID: Sarai Bashir is a 72 y.o. female.    Chief Complaint:  Breast Pain (right breast pain for a few days)      History of Present Illness  HPI  Pt with complaints of shooting right breast pain for several days.   Pt denies any other obstetrical complications    GYN & OB History  No LMP recorded. Patient is postmenopausal.   Date of Last Pap: 2015    OB History    Para Term  AB SAB TAB Ectopic Multiple Living   4    1 1    3      # Outcome Date GA Lbr Favian/2nd Weight Sex Delivery Anes PTL Lv   4       Vag-Spont   Y   3       Vag-Spont   Y   2       Vag-Spont   Y   1 SAB         FD          Review of Systems  Review of Systems   Constitutional: Negative.    Respiratory: Negative.    Cardiovascular: Negative.    Gastrointestinal: Negative.    Genitourinary: Negative.    Musculoskeletal: Negative.    Skin:  Negative.   Neurological: Negative.    Psychiatric/Behavioral: Negative.    Breast: Positive for breast pain.          Objective:    Physical Exam:   Constitutional: She is oriented to person, place, and time. She appears well-developed and well-nourished.    HENT:   Head: Normocephalic and atraumatic.    Eyes: EOM are normal.    Neck: Normal range of motion.    Cardiovascular: Normal rate.     Pulmonary/Chest: Effort normal. Right breast exhibits no inverted nipple, no mass, no nipple discharge, no skin change, no tenderness, presence, no bleeding and no swelling. Left breast exhibits no inverted nipple, no mass, no nipple discharge, no skin change, no tenderness, presence, no bleeding and no swelling. Breasts are symmetrical.                  Musculoskeletal: Normal range of motion and moves all extremeties.       Neurological: She is alert and oriented to person, place, and time.    Skin: Skin is warm and dry.    Psychiatric: She has a normal mood and affect. Her behavior is normal. Judgment and thought content normal.          Assessment:         1. Pain of right breast                Plan:      Mammogram and ultrasound ordered

## 2017-05-11 ENCOUNTER — HOSPITAL ENCOUNTER (OUTPATIENT)
Dept: RADIOLOGY | Facility: HOSPITAL | Age: 72
Discharge: HOME | End: 2017-05-11
Attending: OBSTETRICS & GYNECOLOGY
Payer: MEDICARE

## 2017-05-11 DIAGNOSIS — N64.4 PAIN OF RIGHT BREAST: ICD-10-CM

## 2017-05-11 PROCEDURE — 77066 DX MAMMO INCL CAD BI: CPT | Mod: 26,,, | Performed by: RADIOLOGY

## 2017-05-11 PROCEDURE — 77062 BREAST TOMOSYNTHESIS BI: CPT | Mod: TC

## 2017-05-11 PROCEDURE — 77062 BREAST TOMOSYNTHESIS BI: CPT | Mod: 26,,, | Performed by: RADIOLOGY

## 2017-05-15 DIAGNOSIS — N32.81 OVERACTIVE BLADDER: ICD-10-CM

## 2017-05-15 RX ORDER — OXYBUTYNIN CHLORIDE 5 MG/1
5 TABLET, EXTENDED RELEASE ORAL DAILY
Qty: 30 TABLET | Refills: 11 | Status: SHIPPED | OUTPATIENT
Start: 2017-05-15 | End: 2017-05-15 | Stop reason: SDUPTHER

## 2017-05-15 RX ORDER — OXYBUTYNIN CHLORIDE 5 MG/1
5 TABLET, EXTENDED RELEASE ORAL DAILY
Qty: 90 TABLET | Refills: 3 | Status: SHIPPED | OUTPATIENT
Start: 2017-05-15 | End: 2017-10-24 | Stop reason: ALTCHOICE

## 2017-05-15 NOTE — TELEPHONE ENCOUNTER
----- Message from Chen Raygoza sent at 5/15/2017 11:57 AM CDT -----  Contact: self [phone]  Patient is requesting a refill on oxybutynin to be ordered through [facility] in [place], [place]. Please call patient at [phone].

## 2017-05-15 NOTE — TELEPHONE ENCOUNTER
Patient needs a refill of her oxybutynin sent to her local and mail order rx                Patient informed, verbalized understanding.  Eye vitamins that she brought information in on were okay for her to take per Dr. Green.

## 2017-05-15 NOTE — TELEPHONE ENCOUNTER
----- Message from Chen Raygoza sent at 5/15/2017 12:00 PM CDT -----  Contact: self [phone]  Patient needs another prescription sent through [facility] through [employer] [facility] as well as the [facility] in [place], [place]. See previous message. Patient can be contacted at [phone].

## 2017-05-17 ENCOUNTER — OFFICE VISIT (OUTPATIENT)
Dept: CARDIOLOGY | Facility: CLINIC | Age: 72
End: 2017-05-17
Payer: MEDICARE

## 2017-05-17 VITALS
OXYGEN SATURATION: 93 % | HEART RATE: 68 BPM | DIASTOLIC BLOOD PRESSURE: 67 MMHG | BODY MASS INDEX: 49.26 KG/M2 | SYSTOLIC BLOOD PRESSURE: 152 MMHG | HEIGHT: 64 IN | WEIGHT: 288.56 LBS

## 2017-05-17 DIAGNOSIS — Z91.89 CARDIOVASCULAR RISK FACTOR: ICD-10-CM

## 2017-05-17 DIAGNOSIS — I10 ESSENTIAL HYPERTENSION: Primary | ICD-10-CM

## 2017-05-17 DIAGNOSIS — G47.33 OSA (OBSTRUCTIVE SLEEP APNEA): ICD-10-CM

## 2017-05-17 DIAGNOSIS — E66.01 MORBID OBESITY DUE TO EXCESS CALORIES: ICD-10-CM

## 2017-05-17 DIAGNOSIS — F43.9 STRESS AT HOME: ICD-10-CM

## 2017-05-17 DIAGNOSIS — R60.0 PERIPHERAL EDEMA: ICD-10-CM

## 2017-05-17 DIAGNOSIS — Z79.1 NSAID LONG-TERM USE: ICD-10-CM

## 2017-05-17 DIAGNOSIS — E78.00 PURE HYPERCHOLESTEROLEMIA: ICD-10-CM

## 2017-05-17 DIAGNOSIS — R06.09 DOE (DYSPNEA ON EXERTION): ICD-10-CM

## 2017-05-17 DIAGNOSIS — I63.132 CEREBRAL INFARCTION DUE TO EMBOLISM OF LEFT CAROTID ARTERY: ICD-10-CM

## 2017-05-17 PROCEDURE — 99999 PR PBB SHADOW E&M-EST. PATIENT-LVL III: CPT | Mod: PBBFAC,,, | Performed by: INTERNAL MEDICINE

## 2017-05-17 PROCEDURE — 99213 OFFICE O/P EST LOW 20 MIN: CPT | Mod: PBBFAC,PO | Performed by: INTERNAL MEDICINE

## 2017-05-17 PROCEDURE — 99214 OFFICE O/P EST MOD 30 MIN: CPT | Mod: S$PBB,,, | Performed by: INTERNAL MEDICINE

## 2017-05-17 NOTE — MR AVS SNAPSHOT
[facility]  [address]  [place] [address]  Phone: [phone]                  Sarai Bashir   2017 1:40 PM   Office Visit    Description:  Female : 1945   Provider:  Alex Hernandez MD   Department:  [place] [facility]           Reason for Visit     Follow-up           Diagnoses this Visit        Comments    Essential hypertension    -  Primary     NSAID long-term use         Peripheral edema         Morbid obesity due to excess calories         Cardiovascular risk factor         Stress at home         Pure hypercholesterolemia         Cerebral infarction due to embolism of left carotid artery         KENTRELL (obstructive sleep apnea)         BARBOZA (dyspnea on exertion)                To Do List           Future Appointments        Provider Department Dept Phone    2017 8:30 AM MD [facility] - Endo/Diabetes [phone]    2017 10:00 AM [facility] - Lab [phone]    2017 1:00 PM MD [facility] - Hematology Oncology [phone]    10/9/2017 9:30 AM MD [facility] - Family Medicine [phone]      Goals (5 Years of Data)     None      Follow-Up and Disposition     Return in about 1 year (around 2018).    Follow-up and Disposition History      [facility]     [facility] Nurse Care Line -  Assistance  Unless otherwise directed by your provider, please contact [facility], our nurse care line that is available for  assistance.     Registered nurses in the [facility] provide: appointment scheduling, clinical advisement, health education, and other advisory services.  Call: [phone] (toll free)               Medications           Message regarding Medications     Verify the changes and/or additions to your medication regime listed below are the same as discussed with your clinician today.  If any of these changes or  additions are incorrect, please notify your healthcare provider.             Verify that the below list of medications is an accurate representation of the medications you are currently taking.  If none reported, the list may be blank. If incorrect, please contact your healthcare provider. Carry this list with you in case of emergency.           Current Medications     amlodipine (NORVASC) 5 MG tablet Take 1 tablet (5 mg total) by mouth once daily.    ascorbic acid (VITAMIN C) 500 MG tablet Take 500 mg by mouth once daily.      atorvastatin (LIPITOR) 40 MG tablet Take 1 tablet (40 mg total) by mouth once daily.    baclofen (LIORESAL) 10 MG tablet Take 1 tablet (10 mg total) by mouth 2 (two) times daily. Prn muscle spasm    brinzolamide (AZOPT) 1 % ophthalmic suspension 1 drop 2 (two) times daily.    BROMFENAC SODIUM (PROLENSA OPHT) Apply to eye 2 (two) times daily.    CALCIUM CARBONATE/VITAMIN D3 (VITAMIN D-3 ORAL) Take 2,000 Int'l Units by mouth.    CRANBERRY EXTRACT ORAL Take 1 capsule by mouth once daily.    cyanocobalamin (VITAMIN B-12) 500 MCG tablet 1 Tablet(s) Oral PRN Every other day.      econazole nitrate 1 % cream Apply to both soles and interdigital spaces BID    fish oil-omega-3 fatty acids 300-1,000 mg capsule Take 2 g by mouth once daily.    FLAXSEED ORAL Take by mouth.    loteprednol (LOTEMAX) 0.5 % ophthalmic suspension 1 drop 4 (four) times daily.    metoprolol tartrate (LOPRESSOR) 50 MG tablet Take 1 tablet (50 mg total) by mouth 2 (two) times daily.    naproxen (EC-NAPROSYN) 500 MG EC tablet Take 1 tablet (500 mg total) by mouth 2 (two) times daily with meals.    nystatin (MYCOSTATIN) cream Apply topically 2 (two) times daily.    oxybutynin (DITROPAN-XL) 5 MG TR24 Take 1 tablet (5 mg total) by mouth once daily. For overactive bladder    tramadol (ULTRAM) 50 mg tablet Take 50 mg by mouth as needed for Pain.    VITAMIN B COMP AND VIT C NO.6 (VITAMIN B COMP WITH VIT C NO.6 ORAL) No Sig Provided     "vitamin E 400 UNIT capsule Take 400 Units by mouth once daily.      folic acid (FOLVITE) 800 MCG tablet Take by mouth Daily. Every day           Clinical Reference Information           Your Vitals Were     BP Pulse Height Weight SpO2 BMI    152/67 68 5' 4" (1.626 m) 130.9 kg (288 lb 9.3 oz) 93% 49.53 kg/m2      Blood Pressure          Most Recent Value    Right Arm BP - Sitting  152/67    Left Arm BP - Sitting  181/81    BP  (!)  152/67      Allergies as of 5/17/2017     Actonel [Risedronate]    Fosamax [Alendronate]      Immunizations Administered on Date of Encounter - 5/17/2017     None      Language Assistance Services     ATTENTION: Language assistance services are available, free of charge. Please call [phone].      ATENCIÓN: Si lionella cristhian, tiene a mcintyre disposición servicios gratuitos de asistencia lingüística. Llame al [phone].     SHRUTHI Ý: N?u b?n nói Ti?ng Vi?t, có các d?ch v? h? tr? ngôn ng? mi?n phí dành cho b?n. G?i s? [phone].         [facility] complies with applicable Federal civil rights laws and does not discriminate on the basis of race, color, national origin, age, disability, or sex.        "
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
## 2017-05-22 ENCOUNTER — LAB VISIT (OUTPATIENT)
Dept: LAB | Facility: HOSPITAL | Age: 72
End: 2017-05-22
Attending: INTERNAL MEDICINE
Payer: MEDICARE

## 2017-05-22 ENCOUNTER — OFFICE VISIT (OUTPATIENT)
Dept: ENDOCRINOLOGY | Facility: CLINIC | Age: 72
End: 2017-05-22
Payer: MEDICARE

## 2017-05-22 VITALS
SYSTOLIC BLOOD PRESSURE: 160 MMHG | TEMPERATURE: 98 F | BODY MASS INDEX: 49.23 KG/M2 | WEIGHT: 288.38 LBS | RESPIRATION RATE: 16 BRPM | DIASTOLIC BLOOD PRESSURE: 77 MMHG | HEART RATE: 67 BPM | HEIGHT: 64 IN

## 2017-05-22 DIAGNOSIS — I10 ESSENTIAL HYPERTENSION: ICD-10-CM

## 2017-05-22 DIAGNOSIS — E78.00 PURE HYPERCHOLESTEROLEMIA: ICD-10-CM

## 2017-05-22 DIAGNOSIS — Z86.69 HISTORY OF GUILLAIN-BARRE SYNDROME: ICD-10-CM

## 2017-05-22 DIAGNOSIS — Z86.010 HISTORY OF COLONIC POLYPS: ICD-10-CM

## 2017-05-22 DIAGNOSIS — E88.810 DYSMETABOLIC SYNDROME: ICD-10-CM

## 2017-05-22 DIAGNOSIS — R73.9 HYPERGLYCEMIA: ICD-10-CM

## 2017-05-22 DIAGNOSIS — E66.01 MORBID OBESITY DUE TO EXCESS CALORIES: ICD-10-CM

## 2017-05-22 DIAGNOSIS — M81.0 OSTEOPOROSIS: ICD-10-CM

## 2017-05-22 DIAGNOSIS — M81.0 OSTEOPOROSIS, UNSPECIFIED OSTEOPOROSIS TYPE, UNSPECIFIED PATHOLOGICAL FRACTURE PRESENCE: ICD-10-CM

## 2017-05-22 DIAGNOSIS — E04.9 GOITER: Primary | ICD-10-CM

## 2017-05-22 DIAGNOSIS — G47.33 OSA (OBSTRUCTIVE SLEEP APNEA): ICD-10-CM

## 2017-05-22 DIAGNOSIS — E04.1 NODULAR THYROID DISEASE: ICD-10-CM

## 2017-05-22 DIAGNOSIS — Z78.0 POSTMENOPAUSAL: ICD-10-CM

## 2017-05-22 DIAGNOSIS — E06.9 THYROIDITIS: ICD-10-CM

## 2017-05-22 LAB
ALBUMIN SERPL BCP-MCNC: 3.4 G/DL
ALP SERPL-CCNC: 83 U/L
ALT SERPL W/O P-5'-P-CCNC: 17 U/L
ANION GAP SERPL CALC-SCNC: 7 MMOL/L
AST SERPL-CCNC: 19 U/L
BILIRUB SERPL-MCNC: 0.9 MG/DL
BUN SERPL-MCNC: 10 MG/DL
CALCIUM SERPL-MCNC: 9.5 MG/DL
CHLORIDE SERPL-SCNC: 107 MMOL/L
CO2 SERPL-SCNC: 31 MMOL/L
CREAT SERPL-MCNC: 0.8 MG/DL
EST. GFR  (AFRICAN AMERICAN): >60 ML/MIN/1.73 M^2
EST. GFR  (NON AFRICAN AMERICAN): >60 ML/MIN/1.73 M^2
GLUCOSE SERPL-MCNC: 97 MG/DL
POTASSIUM SERPL-SCNC: 3.6 MMOL/L
PROT SERPL-MCNC: 7.3 G/DL
SODIUM SERPL-SCNC: 145 MMOL/L

## 2017-05-22 PROCEDURE — 99214 OFFICE O/P EST MOD 30 MIN: CPT | Mod: S$PBB,,, | Performed by: INTERNAL MEDICINE

## 2017-05-22 PROCEDURE — 36415 COLL VENOUS BLD VENIPUNCTURE: CPT | Mod: PO

## 2017-05-22 PROCEDURE — 80053 COMPREHEN METABOLIC PANEL: CPT | Mod: 91

## 2017-05-22 PROCEDURE — 99999 PR PBB SHADOW E&M-EST. PATIENT-LVL IV: CPT | Mod: PBBFAC,,, | Performed by: INTERNAL MEDICINE

## 2017-05-22 NOTE — PROGRESS NOTES
Subjective:      Patient ID: Sarai Bashir is a 72 y.o. female.    Chief Complaint:  Thyroid Nodule    72 yr old lady with thyroid nodular disease and osteoporosis seen in up today.    History of Present Illness    Patient is a 72 yr old lady seen in follow up today on account of thyroid nodular disease with USS showing multiple dominant nodules with micro and macrocalcifications.  Patient was referred to general surgery for elective thyriodectomy but instead has USS guided biopsies of 3 of the noted nodules all of which showed features consistent with benign follicular nodules.  She has no voice hoaresenss or dysphagia.   Patients most recent thyroid USS was from 08/16 and showed no significant interval change in previously noted nodules. Patient in addition has multiple other comorbidities including hypertension, morbid obesity, hyperlipidemia, OSAS (uses a CPAP mask) with a past history of Guillain [place] syndrome, CAD, sickle cell trait and past history of colonic polyps (has colonoscopies done every 3 yrs). Patient is also postmenopausal.  Patient had DEXA done last 09/15 with findings consistent with osteoporosis based on which she was commenced on biphosphonate therapy but she could not tolerate either actonel or fosamax and so she was instead to have recently commenced on prolia injection instead. This has had to have been placed on hold because of pending dental work. Her next [facility] DEXA thus should be for ~ 09/17. Basic hormonal screening for potential secondary causes was essentially unremarkable.     Patient has been unable to tolerate fosamax which caused her to have marked asthenia and this resolved when she stopped it. She was thus switched to actonel which it appears she has also stopped. She indicates that this caused her to have myalgias and hip discomfort. She had a fall a few weeks.  She has been getting intrarticular steroid injections into her left shoulder due to severe OA. She has also  "been receiving injections into her hands on account of arthritic symptoms in her hands.    Review of Systems   Constitutional: Negative for fatigue and fever.   HENT: Negative for facial swelling and voice change. Trouble swallowing:  occasional jaw spasms.    Eyes: Negative for visual disturbance.   Respiratory: Negative for cough and shortness of breath.    Cardiovascular: Negative for chest pain and palpitations.   Gastrointestinal: Negative for nausea and vomiting.   Endocrine: Negative for polyuria.   Musculoskeletal: Positive for arthralgias (intermittent and fleeting. Involving both small joints of the hands as well as hips.) and myalgias (intermittent). Negative for gait problem.   Skin: Negative for color change, pallor and rash.   Neurological: Negative for seizures, light-headedness and headaches.   Hematological: Does not bruise/bleed easily.   Psychiatric/Behavioral: Negative for confusion. The patient is not nervous/anxious.        Objective:  BP (!) 160/77 (BP Location: Right arm, Patient Position: Sitting, BP Method: Automatic)   Pulse 67   Temp 97.8 °F (36.6 °C) (Oral)   Resp 16   Ht 5' 4" (1.626 m)   Wt 130.8 kg (288 lb 5.8 oz)   BMI 49.50 kg/m²   Patient has not yet taken her anthypertensives this am.   Physical Exam   Constitutional: She is oriented to person, place, and time. She appears well-developed and well-nourished. No distress.   Pleasant elderly lady. Not pale, anicteric and afebrile.  Well hydrated. Not in any acute distress.   HENT:   Head: Normocephalic and atraumatic.   Eyes: Conjunctivae and EOM are normal. Pupils are equal, round, and reactive to light. No scleral icterus.   Neck: Normal range of motion. Neck supple. No JVD present. Thyromegaly present.   Visible thyromegaly with knobbly firm consistency.   Cardiovascular: Normal rate, regular rhythm and normal heart sounds.    Pulmonary/Chest: Effort normal and breath sounds normal. No respiratory distress. She has no " wheezes.   Abdominal: Soft. There is no tenderness.   Obese anterior abdominal wall   Musculoskeletal: Normal range of motion.   Neurological: She is alert and oriented to person, place, and time. No cranial nerve deficit.   Skin: Skin is warm and dry. No rash noted. She is not diaphoretic. No erythema. No pallor.   Psychiatric: She has a normal mood and affect. Her behavior is normal. Judgment and thought content normal.   Vitals reviewed.      Lab Review:     Results for NATHALY INTERIANO (MRN [id number]) as of 5/22/2017 08:48   Ref. Range 5/5/2017 10:50 5/11/2017 13:26   Triglycerides Latest Ref Range: 30 - 150 mg/dL 126    Cholesterol Latest Ref Range: 120 - 199 mg/dL 145    HDL Latest Ref Range: 40 - 75 mg/dL 70    LDL Cholesterol Latest Ref Range: 63.0 - 159.0 mg/dL 49.8 (L)    Total Cholesterol/HDL Ratio Latest Ref Range: 2.0 - 5.0  2.1    CRP, High Sensitivity Latest Ref Range: 0.00 - 3.19 mg/L 1.09        Assessment:     1. Goiter  US Soft Tissue Head Neck Thyroid    T4, free    T3    TSH   2. Thyroiditis  T4, free    T3    TSH    CBC auto differential    Uric acid   3. Osteoporosis, unspecified osteoporosis type, unspecified pathological fracture presence  DXA Bone Density Spine And Hip    Comprehensive metabolic panel    CBC auto differential    Vitamin D    PTH, intact    Calcium, ionized   4. Pure hypercholesterolemia  Lipid panel   5. Morbid obesity due to excess calories  Uric acid    Vitamin D    PTH, intact    Hemoglobin A1c    Microalbumin/creatinine urine ratio    Urinalysis   6. Essential hypertension  Uric acid    PTH, intact    Aldosterone    Renin    Lipid panel    Hemoglobin A1c    Microalbumin/creatinine urine ratio    Urinalysis   7. KENTRELL (obstructive sleep apnea) on CPAP 100% use  Aldosterone    Renin   8. History of colonic polyps     9. History of Guillain-[place] syndrome     10. Postmenopausal  DXA Bone Density Spine And Hip    Vitamin D    Calcium, ionized   11. Nodular thyroid disease  US  Soft Tissue Head Neck Thyroid    T4, free    T3    Thyroglobulin    Calcitonin    Chromogranin A   12. Hyperglycemia  Hemoglobin A1c   13. Dysmetabolic syndrome  Hemoglobin A1c        Regarding thyroid nodular disease. TFts are normal. For ffup thyroid USS in ~ 08/17 to evaluate sonographic stability of prior noted nodules.  Regarding hypertension; BP control is fair; had not taken her antihypertensives this am hence the mild systolic BP elevation. To continue present antihypertensive regimen and serial ambulatory BP tracking as before. No change to antihypertensive regimen is indicated at present.  Regarding OSAS; stable; to continue CPAP therapy as before.  Regarding hyperlipidemia with hypercholesterolemia; To continue Statin therapy as before  Regarding osteoporosis; Patient unable to tolerate Actonel or Fosamax and thus will not try Reclast as she seems to have a biphosphonate class sensitivity. She also does not want daily injections (viz forteo). Because of recent financial difficulties she is yet to complete prior scheduled dental work and so has not been able to get full dental clearance yet to enable her commence prolia 60mg 6mthly injections. For ffup DEXA 09/17.  Regarding CVD; ongoing management and surviellance by her PCP. To continue low dose asa as before    Plan:     FFup in ~ 4mths.

## 2017-05-25 ENCOUNTER — TELEPHONE (OUTPATIENT)
Dept: HEMATOLOGY/ONCOLOGY | Facility: CLINIC | Age: 72
End: 2017-05-25

## 2017-05-25 NOTE — TELEPHONE ENCOUNTER
Returned pts call and canceled her Dr. Edwards appt due to her not knowing when her dental work will be completed.  Pt said she will call back to reschedule when she knows her dental work will be complete.

## 2017-05-25 NOTE — TELEPHONE ENCOUNTER
----- Message from Casandra Nixon sent at 5/25/2017  1:37 PM CDT -----  Contact: Patient  Sarai, patient [phone], Patient needs to cancel and reschedule her appointment from 5/31/17. She has had some set backs and the dental work is not complete. Can you move the appointment to possibly the end of August 2017. Please advise.  Thanks.

## 2017-06-13 ENCOUNTER — TELEPHONE (OUTPATIENT)
Dept: HEMATOLOGY/ONCOLOGY | Facility: CLINIC | Age: 72
End: 2017-06-13

## 2017-06-13 DIAGNOSIS — M81.0 SENILE OSTEOPOROSIS: Primary | ICD-10-CM

## 2017-06-13 NOTE — TELEPHONE ENCOUNTER
Returned pts call and scheduled her an appt with Dr. Edwards for 9/25/17 @ 1120, prolia @ 12 noon, and a lab appt at 9/25/17 @ 2126.  Pt states she finished her dental work today 6/13/17 and is aware that she has to wait at least 3 months to receive prolia after dental work.  Pt verbalizes understanding and appreciation.

## 2017-06-13 NOTE — TELEPHONE ENCOUNTER
----- Message from Hodan Hernandez sent at 6/13/2017  2:08 PM CDT -----  Patient finished her dental work today. She needs to set up an appointment for her prolia injection. Please call patient back at [phone]. Thank you

## 2017-07-14 DIAGNOSIS — I10 ESSENTIAL HYPERTENSION: ICD-10-CM

## 2017-07-14 RX ORDER — AMLODIPINE BESYLATE 5 MG/1
5 TABLET ORAL DAILY
Qty: 90 TABLET | Refills: 3 | Status: SHIPPED | OUTPATIENT
Start: 2017-07-14 | End: 2018-03-21 | Stop reason: SDUPTHER

## 2017-07-14 NOTE — TELEPHONE ENCOUNTER
----- Message from Asya Hernandez sent at 7/14/2017  8:57 AM CDT -----  Contact: PAtient  Patient is out of her Amlodipine and needs to have prescription sent to [facility] in [place]. Please call patient at [phone] when it has been sent. Patient is having a little swelling in her feet.

## 2017-07-25 DIAGNOSIS — M25.512 LEFT SHOULDER PAIN, UNSPECIFIED CHRONICITY: Primary | ICD-10-CM

## 2017-07-26 ENCOUNTER — OFFICE VISIT (OUTPATIENT)
Dept: ORTHOPEDICS | Facility: CLINIC | Age: 72
End: 2017-07-26
Payer: MEDICARE

## 2017-07-26 ENCOUNTER — HOSPITAL ENCOUNTER (OUTPATIENT)
Dept: RADIOLOGY | Facility: HOSPITAL | Age: 72
Discharge: HOME | End: 2017-07-26
Attending: ORTHOPAEDIC SURGERY
Payer: MEDICARE

## 2017-07-26 VITALS
HEIGHT: 64 IN | BODY MASS INDEX: 49.23 KG/M2 | DIASTOLIC BLOOD PRESSURE: 67 MMHG | SYSTOLIC BLOOD PRESSURE: 146 MMHG | WEIGHT: 288.38 LBS | HEART RATE: 67 BPM

## 2017-07-26 DIAGNOSIS — M25.512 LEFT SHOULDER PAIN, UNSPECIFIED CHRONICITY: ICD-10-CM

## 2017-07-26 DIAGNOSIS — M25.512 LEFT SHOULDER PAIN, UNSPECIFIED CHRONICITY: Primary | ICD-10-CM

## 2017-07-26 PROCEDURE — 73030 X-RAY EXAM OF SHOULDER: CPT | Mod: 26,LT,, | Performed by: RADIOLOGY

## 2017-07-26 PROCEDURE — 99213 OFFICE O/P EST LOW 20 MIN: CPT | Mod: 25,S$PBB,, | Performed by: ORTHOPAEDIC SURGERY

## 2017-07-26 PROCEDURE — 99999 PR PBB SHADOW E&M-EST. PATIENT-LVL III: CPT | Mod: PBBFAC,,, | Performed by: ORTHOPAEDIC SURGERY

## 2017-07-26 PROCEDURE — 20610 DRAIN/INJ JOINT/BURSA W/O US: CPT | Mod: S$PBB,LT,, | Performed by: ORTHOPAEDIC SURGERY

## 2017-07-26 PROCEDURE — 1159F MED LIST DOCD IN RCRD: CPT | Mod: ,,, | Performed by: ORTHOPAEDIC SURGERY

## 2017-07-26 PROCEDURE — 1125F AMNT PAIN NOTED PAIN PRSNT: CPT | Mod: ,,, | Performed by: ORTHOPAEDIC SURGERY

## 2017-07-26 PROCEDURE — 73030 X-RAY EXAM OF SHOULDER: CPT | Mod: TC,PN,LT

## 2017-07-26 RX ORDER — TRIAMCINOLONE ACETONIDE 40 MG/ML
40 INJECTION, SUSPENSION INTRA-ARTICULAR; INTRAMUSCULAR
Status: DISCONTINUED | OUTPATIENT
Start: 2017-07-26 | End: 2017-07-26 | Stop reason: HOSPADM

## 2017-07-26 RX ADMIN — TRIAMCINOLONE ACETONIDE 40 MG: 40 INJECTION, SUSPENSION INTRA-ARTICULAR; INTRAMUSCULAR at 05:07

## 2017-07-26 NOTE — PROCEDURES
Large Joint Aspiration/Injection  Date/Time: 7/26/2017 5:39 PM  Performed by: SATURNINO MEADOWS  Authorized by: SATURNINO MEADOWS     Consent Done?:  Yes (Verbal)  Indications:  Pain  Timeout: Prior to procedure the correct patient, procedure, and site was verified      Location:  Shoulder  Site:  L subacromial bursa  Prep: Patient was prepped and draped in usual sterile fashion    Approach:  Lateral  Medications:  40 mg triamcinolone acetonide 40 mg/mL

## 2017-07-26 NOTE — PROGRESS NOTES
Past Medical History:   Diagnosis Date    Allergy     Fatigue     History of Guillain-[place] syndrome     HTN (hypertension)     Hyperlipidemia     Neuromuscular disorder     Obesity     KENTRELL (obstructive sleep apnea) 7/9/2013    Sickle cell trait 8/31/15    Urinary tract infection        Past Surgical History:   Procedure Laterality Date    ADENOIDECTOMY      CHOLECYSTECTOMY      TONSILLECTOMY      TONSILLECTOMY, ADENOIDECTOMY, BILATERAL MYRINGOTOMY AND TUBES         Current Outpatient Prescriptions   Medication Sig    amlodipine (NORVASC) 5 MG tablet Take 1 tablet (5 mg total) by mouth once daily.    ascorbic acid (VITAMIN C) 500 MG tablet Take 500 mg by mouth once daily.      ASPIRIN (ASPIR-81 ORAL) Take by mouth.    atorvastatin (LIPITOR) 40 MG tablet Take 1 tablet (40 mg total) by mouth once daily.    baclofen (LIORESAL) 10 MG tablet Take 1 tablet (10 mg total) by mouth 2 (two) times daily. Prn muscle spasm    brinzolamide (AZOPT) 1 % ophthalmic suspension 1 drop 2 (two) times daily.    BROMFENAC SODIUM (PROLENSA OPHT) Apply to eye 2 (two) times daily.    CALCIUM CARBONATE/VITAMIN D3 (VITAMIN D-3 ORAL) Take 2,000 Int'l Units by mouth.    CRANBERRY EXTRACT ORAL Take 1 capsule by mouth once daily.    cyanocobalamin (VITAMIN B-12) 500 MCG tablet 1 Tablet(s) Oral PRN Every other day.      econazole nitrate 1 % cream Apply to both soles and interdigital spaces BID    fish oil-omega-3 fatty acids 300-1,000 mg capsule Take 2 g by mouth once daily.    FLAXSEED ORAL Take by mouth.    folic acid (FOLVITE) 800 MCG tablet Take by mouth Daily. Every day    loteprednol (LOTEMAX) 0.5 % ophthalmic suspension 1 drop 4 (four) times daily.    metoprolol tartrate (LOPRESSOR) 50 MG tablet Take 1 tablet (50 mg total) by mouth 2 (two) times daily.    naproxen (EC-NAPROSYN) 500 MG EC tablet Take 1 tablet (500 mg total) by mouth 2 (two) times daily with meals.    nystatin (MYCOSTATIN) cream Apply topically  2 (two) times daily.    oxybutynin (DITROPAN-XL) 5 MG TR24 Take 1 tablet (5 mg total) by mouth once daily. For overactive bladder    tramadol (ULTRAM) 50 mg tablet Take 50 mg by mouth as needed for Pain.    VITAMIN B COMP AND VIT C NO.6 (VITAMIN B COMP WITH VIT C NO.6 ORAL) No Sig Provided    vitamin E 400 UNIT capsule Take 400 Units by mouth once daily.       No current facility-administered medications for this visit.        Allergies   Allergen Reactions    Actonel [Risedronate]      myalgia    Fosamax [Alendronate] Other (See Comments)       Family History   Problem Relation Age of Onset    Cancer Mother     Melanoma Father     Urolithiasis Daughter     Melanoma Other     Prostate cancer Neg Hx     Kidney cancer Neg Hx     Breast cancer Neg Hx     Ovarian cancer Neg Hx     Psoriasis Neg Hx     Lupus Neg Hx     Eczema Neg Hx        Social History     Social History    Marital status:      Spouse name: N/A    Number of children: N/A    Years of education: N/A     Occupational History    Not on file.     Social History Main Topics    Smoking status: Never Smoker    Smokeless tobacco: Never Used    Alcohol use No    Drug use: No    Sexual activity: No     Other Topics Concern    Not on file     Social History Narrative    No narrative on file       Chief Complaint:   Chief Complaint   Patient presents with    Left Shoulder - Pain       Consulting Physician: No ref. provider found    History of present illness: This is a 71-year-old woman who complains of left shoulder pain for approximately the last week.  We saw her about a year ago and diagnosed her with impingement and some cuff weakness.  We gave her an injection at that time she's been comfortable until now.    She has a history of Guillain-Barré syndrome.      Review of Systems:    Constitution: Denies chills, fever, and sweats.    HENT: Denies headaches or blurry vision.    Cardiovascular: Denies chest pain or irregular  heart beat.    Respiratory: Denies cough or shortness of breath.    Gastrointestinal: Denies abdominal pain, nausea, or vomiting.    Musculoskeletal:  Denies muscle cramps.    Neurological: Denies dizziness or focal weakness.    Psychiatric/Behavioral: Normal mental status.    Hematologic/Lymphatic: Denies bleeding problem or easy bruising/bleeding.    Skin: Denies rash or suspicious lesions.      Examination:    Vital Signs:    Vitals:    07/26/17 1459   BP: (!) 146/67   Pulse: 67       Body mass index is 49.5 kg/m².    This a well-developed, well nourished patient in no acute distress.    Alert and oriented and cooperative to examination.       Physical Exam: Left Shoulder Exam     Skin  Rash:   None  Scars:   None    Inspection/Observation   Swelling:   none  Erythema:   none  Bruising:   none  Wounds:   none  Scapular Winging:  none  Scapular Dyskinesia:  mild  Atrophy:   none  Masses:   None  Lymphadenopathy: None    Tenderness   AC Joint:   Mild  Bicep groove:  Mild    Range of Motion   Active Forward Flexion:  140   Active External Rotation:  45  Active Internal Rotation:  Hip pocket    Passive Forward Flexion:  180  Passive External Rotation:  >45  Passive Internal Rotation at 90 degrees: Limited    Muscle Strength   Forward Flexion:  3+-4/5  External Rotation:  4/5  Internal Rotation:  5/5    Tests & Signs   Cross Arm:   negative  Hawkin's test:   positive  Impingement:   positive    Other   Sensation:  normal  Pulse:    2+ radial        Imaging: X-rays ordered and reviewed today show degenerative changes of both the glenohumeral and acromioclavicular joints of the left shoulder.     Assessment: Left shoulder pain, unspecified chronicity  -     Large Joint Aspiration/Injection        Plan:  She continues to have some shoulder impingement along with some cuff weakness.  We went ahead and gave her left shoulder an injection today to see if we make her feel better.  We've instructed her if this doesn't work  then we'll probably try some physical therapy and then possibly an MRI for degenerative cuff pathology.      DISCLAIMER: This note may have been dictated using voice recognition software and may contain grammatical errors.     NOTE: Consult report sent to referring provider via [employer] EMR.

## 2017-08-18 ENCOUNTER — OFFICE VISIT (OUTPATIENT)
Dept: ORTHOPEDICS | Facility: CLINIC | Age: 72
End: 2017-08-18
Payer: MEDICARE

## 2017-08-18 VITALS
SYSTOLIC BLOOD PRESSURE: 153 MMHG | WEIGHT: 288.38 LBS | BODY MASS INDEX: 49.23 KG/M2 | HEART RATE: 74 BPM | HEIGHT: 64 IN | DIASTOLIC BLOOD PRESSURE: 68 MMHG

## 2017-08-18 DIAGNOSIS — M25.512 LEFT SHOULDER PAIN, UNSPECIFIED CHRONICITY: Primary | ICD-10-CM

## 2017-08-18 PROCEDURE — 3077F SYST BP >= 140 MM HG: CPT | Mod: ,,, | Performed by: ORTHOPAEDIC SURGERY

## 2017-08-18 PROCEDURE — 1125F AMNT PAIN NOTED PAIN PRSNT: CPT | Mod: ,,, | Performed by: ORTHOPAEDIC SURGERY

## 2017-08-18 PROCEDURE — 99213 OFFICE O/P EST LOW 20 MIN: CPT | Mod: PBBFAC,PN | Performed by: ORTHOPAEDIC SURGERY

## 2017-08-18 PROCEDURE — 99213 OFFICE O/P EST LOW 20 MIN: CPT | Mod: S$PBB,25,, | Performed by: ORTHOPAEDIC SURGERY

## 2017-08-18 PROCEDURE — 3078F DIAST BP <80 MM HG: CPT | Mod: ,,, | Performed by: ORTHOPAEDIC SURGERY

## 2017-08-18 PROCEDURE — 20610 DRAIN/INJ JOINT/BURSA W/O US: CPT | Mod: PBBFAC,PN | Performed by: ORTHOPAEDIC SURGERY

## 2017-08-18 PROCEDURE — 99999 PR PBB SHADOW E&M-EST. PATIENT-LVL III: CPT | Mod: PBBFAC,,, | Performed by: ORTHOPAEDIC SURGERY

## 2017-08-18 PROCEDURE — 1159F MED LIST DOCD IN RCRD: CPT | Mod: ,,, | Performed by: ORTHOPAEDIC SURGERY

## 2017-08-18 RX ORDER — TRIAMCINOLONE ACETONIDE 40 MG/ML
40 INJECTION, SUSPENSION INTRA-ARTICULAR; INTRAMUSCULAR
Status: DISCONTINUED | OUTPATIENT
Start: 2017-08-18 | End: 2017-08-18 | Stop reason: HOSPADM

## 2017-08-18 RX ADMIN — TRIAMCINOLONE ACETONIDE 40 MG: 40 INJECTION, SUSPENSION INTRA-ARTICULAR; INTRAMUSCULAR at 05:08

## 2017-08-18 NOTE — PROCEDURES
Large Joint Aspiration/Injection  Date/Time: 8/18/2017 5:49 PM  Performed by: SATURNINO MEADOWS  Authorized by: SATURNINO MEAODWS     Consent Done?:  Yes (Verbal)  Indications:  Pain  Timeout: Prior to procedure the correct patient, procedure, and site was verified      Location:  Shoulder  Site:  L subacromial bursa  Prep: Patient was prepped and draped in usual sterile fashion    Approach:  Lateral  Medications:  40 mg triamcinolone acetonide 40 mg/mL

## 2017-08-18 NOTE — PROGRESS NOTES
Past Medical History:   Diagnosis Date    Allergy     Fatigue     History of Guillain-[place] syndrome     HTN (hypertension)     Hyperlipidemia     Neuromuscular disorder     Obesity     KENTRELL (obstructive sleep apnea) 7/9/2013    Sickle cell trait 8/31/15    Urinary tract infection        Past Surgical History:   Procedure Laterality Date    ADENOIDECTOMY      CHOLECYSTECTOMY      TONSILLECTOMY      TONSILLECTOMY, ADENOIDECTOMY, BILATERAL MYRINGOTOMY AND TUBES         Current Outpatient Prescriptions   Medication Sig    amlodipine (NORVASC) 5 MG tablet Take 1 tablet (5 mg total) by mouth once daily.    ascorbic acid (VITAMIN C) 500 MG tablet Take 500 mg by mouth once daily.      ASPIRIN (ASPIR-81 ORAL) Take by mouth.    atorvastatin (LIPITOR) 40 MG tablet Take 1 tablet (40 mg total) by mouth once daily.    brinzolamide (AZOPT) 1 % ophthalmic suspension 1 drop 2 (two) times daily.    BROMFENAC SODIUM (PROLENSA OPHT) Apply to eye 2 (two) times daily.    CALCIUM CARBONATE/VITAMIN D3 (VITAMIN D-3 ORAL) Take 2,000 Int'l Units by mouth.    CRANBERRY EXTRACT ORAL Take 1 capsule by mouth once daily.    cyanocobalamin (VITAMIN B-12) 500 MCG tablet 1 Tablet(s) Oral PRN Every other day.      econazole nitrate 1 % cream Apply to both soles and interdigital spaces BID    fish oil-omega-3 fatty acids 300-1,000 mg capsule Take 2 g by mouth once daily.    FLAXSEED ORAL Take by mouth.    folic acid (FOLVITE) 800 MCG tablet Take by mouth Daily. Every day    loteprednol (LOTEMAX) 0.5 % ophthalmic suspension 1 drop 4 (four) times daily.    metoprolol tartrate (LOPRESSOR) 50 MG tablet Take 1 tablet (50 mg total) by mouth 2 (two) times daily.    naproxen (EC-NAPROSYN) 500 MG EC tablet Take 1 tablet (500 mg total) by mouth 2 (two) times daily with meals.    nystatin (MYCOSTATIN) cream Apply topically 2 (two) times daily.    oxybutynin (DITROPAN-XL) 5 MG TR24 Take 1 tablet (5 mg total) by mouth once daily. For  overactive bladder    tramadol (ULTRAM) 50 mg tablet Take 50 mg by mouth as needed for Pain.    VITAMIN B COMP AND VIT C NO.6 (VITAMIN B COMP WITH VIT C NO.6 ORAL) No Sig Provided    vitamin E 400 UNIT capsule Take 400 Units by mouth once daily.      baclofen (LIORESAL) 10 MG tablet Take 1 tablet (10 mg total) by mouth 2 (two) times daily. Prn muscle spasm     No current facility-administered medications for this visit.        Allergies   Allergen Reactions    Actonel [Risedronate]      myalgia    Fosamax [Alendronate] Other (See Comments)       Family History   Problem Relation Age of Onset    Cancer Mother     Melanoma Father     Urolithiasis Daughter     Melanoma Other     Prostate cancer Neg Hx     Kidney cancer Neg Hx     Breast cancer Neg Hx     Ovarian cancer Neg Hx     Psoriasis Neg Hx     Lupus Neg Hx     Eczema Neg Hx        Social History     Social History    Marital status:      Spouse name: N/A    Number of children: N/A    Years of education: N/A     Occupational History    Not on file.     Social History Main Topics    Smoking status: Never Smoker    Smokeless tobacco: Never Used    Alcohol use No    Drug use: No    Sexual activity: No     Other Topics Concern    Not on file     Social History Narrative    No narrative on file       Chief Complaint:   Chief Complaint   Patient presents with    Left Shoulder - Pain       Consulting Physician: No ref. provider found    History of present illness: This is a 71-year-old woman who complains of left shoulder pain. We saw her about a year ago and diagnosed her with impingement and some cuff weakness.  We gave her an injection at this visit she reports that she is significantly better and puts her pain at a 4 out of 10.    She has a history of Guillain-Barré syndrome.      Review of Systems:    Constitution: Denies chills, fever, and sweats.    HENT: Denies headaches or blurry vision.    Cardiovascular: Denies chest pain  or irregular heart beat.    Respiratory: Denies cough or shortness of breath.    Gastrointestinal: Denies abdominal pain, nausea, or vomiting.    Musculoskeletal:  Denies muscle cramps.    Neurological: Denies dizziness or focal weakness.    Psychiatric/Behavioral: Normal mental status.    Hematologic/Lymphatic: Denies bleeding problem or easy bruising/bleeding.    Skin: Denies rash or suspicious lesions.      Examination:    Vital Signs:    Vitals:    08/18/17 1318   BP: (!) 153/68   Pulse: 74       Body mass index is 49.5 kg/m².    This a well-developed, well nourished patient in no acute distress.    Alert and oriented and cooperative to examination.       Physical Exam: Left Shoulder Exam     Skin  Rash:   None  Scars:   None    Inspection/Observation   Swelling:   none  Erythema:   none  Bruising:   none  Wounds:   none  Scapular Winging:  none  Scapular Dyskinesia:  mild  Atrophy:   none  Masses:   None  Lymphadenopathy: None    Tenderness   AC Joint:   Mild  Bicep groove:  Mild    Range of Motion   Active Forward Flexion:  160   Active External Rotation:  45  Active Internal Rotation:  Hip pocket    Passive Forward Flexion:  180  Passive External Rotation:  >45  Passive Internal Rotation at 90 degrees: Limited    Muscle Strength   Forward Flexion:  4-4+/5  External Rotation:  4+/5  Internal Rotation:  5/5    Tests & Signs   Cross Arm:   negative  Hawkin's test:   positive  Impingement:   positive    Other   Sensation:  normal  Pulse:    2+ radial        Imaging: X-rays show degenerative changes of both the glenohumeral and acromioclavicular joints of the left shoulder.     Assessment: Left shoulder pain, unspecified chronicity  -     Large Joint Aspiration/Injection        Plan:  She continues to have some shoulder impingement along with some cuff weakness.  She would like another injection today to see if we can occupational therapy down.  We've instructed her if this doesn't work then we'll probably try  some physical therapy and then possibly an MRI for degenerative cuff pathology.      DISCLAIMER: This note may have been dictated using voice recognition software and may contain grammatical errors.     NOTE: Consult report sent to referring provider via [employer] EMR.

## 2017-09-22 ENCOUNTER — HOSPITAL ENCOUNTER (OUTPATIENT)
Dept: RADIOLOGY | Facility: CLINIC | Age: 72
Discharge: HOME | End: 2017-09-22
Attending: INTERNAL MEDICINE
Payer: MEDICARE

## 2017-09-22 DIAGNOSIS — M81.0 OSTEOPOROSIS, UNSPECIFIED OSTEOPOROSIS TYPE, UNSPECIFIED PATHOLOGICAL FRACTURE PRESENCE: ICD-10-CM

## 2017-09-22 DIAGNOSIS — Z78.0 POSTMENOPAUSAL: ICD-10-CM

## 2017-09-22 PROCEDURE — 77080 DXA BONE DENSITY AXIAL: CPT | Mod: TC,PO

## 2017-09-22 PROCEDURE — 77080 DXA BONE DENSITY AXIAL: CPT | Mod: 26,,, | Performed by: RADIOLOGY

## 2017-09-25 ENCOUNTER — LAB VISIT (OUTPATIENT)
Dept: LAB | Facility: HOSPITAL | Age: 72
End: 2017-09-25
Attending: INTERNAL MEDICINE
Payer: MEDICARE

## 2017-09-25 ENCOUNTER — OFFICE VISIT (OUTPATIENT)
Dept: HEMATOLOGY/ONCOLOGY | Facility: CLINIC | Age: 72
End: 2017-09-25
Payer: MEDICARE

## 2017-09-25 ENCOUNTER — TELEPHONE (OUTPATIENT)
Dept: FAMILY MEDICINE | Facility: CLINIC | Age: 72
End: 2017-09-25

## 2017-09-25 VITALS
HEIGHT: 64 IN | DIASTOLIC BLOOD PRESSURE: 63 MMHG | WEIGHT: 293 LBS | RESPIRATION RATE: 20 BRPM | TEMPERATURE: 98 F | BODY MASS INDEX: 50.02 KG/M2 | HEART RATE: 71 BPM | SYSTOLIC BLOOD PRESSURE: 136 MMHG

## 2017-09-25 DIAGNOSIS — E78.00 PURE HYPERCHOLESTEROLEMIA: ICD-10-CM

## 2017-09-25 DIAGNOSIS — M80.00XA AGE-RELATED OSTEOPOROSIS WITH CURRENT PATHOLOGICAL FRACTURE, INITIAL ENCOUNTER: ICD-10-CM

## 2017-09-25 DIAGNOSIS — I10 ESSENTIAL HYPERTENSION: Primary | ICD-10-CM

## 2017-09-25 DIAGNOSIS — Z86.69 HISTORY OF GUILLAIN-BARRE SYNDROME: ICD-10-CM

## 2017-09-25 DIAGNOSIS — E04.1 NODULAR THYROID DISEASE: Primary | ICD-10-CM

## 2017-09-25 DIAGNOSIS — D64.9 ANEMIA, UNSPECIFIED TYPE: Primary | ICD-10-CM

## 2017-09-25 DIAGNOSIS — M81.0 SENILE OSTEOPOROSIS: ICD-10-CM

## 2017-09-25 LAB
ALBUMIN SERPL BCP-MCNC: 3.3 G/DL
ALP SERPL-CCNC: 86 U/L
ALT SERPL W/O P-5'-P-CCNC: 19 U/L
ANION GAP SERPL CALC-SCNC: 9 MMOL/L
AST SERPL-CCNC: 22 U/L
BILIRUB SERPL-MCNC: 0.7 MG/DL
BUN SERPL-MCNC: 11 MG/DL
CALCIUM SERPL-MCNC: 9.9 MG/DL
CHLORIDE SERPL-SCNC: 106 MMOL/L
CO2 SERPL-SCNC: 30 MMOL/L
CREAT SERPL-MCNC: 0.9 MG/DL
EST. GFR  (AFRICAN AMERICAN): >60 ML/MIN/1.73 M^2
EST. GFR  (NON AFRICAN AMERICAN): >60 ML/MIN/1.73 M^2
GLUCOSE SERPL-MCNC: 107 MG/DL
POTASSIUM SERPL-SCNC: 4.2 MMOL/L
PROT SERPL-MCNC: 6.9 G/DL
SODIUM SERPL-SCNC: 145 MMOL/L

## 2017-09-25 PROCEDURE — 80053 COMPREHEN METABOLIC PANEL: CPT

## 2017-09-25 PROCEDURE — 1159F MED LIST DOCD IN RCRD: CPT | Mod: ,,, | Performed by: INTERNAL MEDICINE

## 2017-09-25 PROCEDURE — 99213 OFFICE O/P EST LOW 20 MIN: CPT | Mod: PBBFAC,PO | Performed by: INTERNAL MEDICINE

## 2017-09-25 PROCEDURE — 36415 COLL VENOUS BLD VENIPUNCTURE: CPT

## 2017-09-25 PROCEDURE — 3075F SYST BP GE 130 - 139MM HG: CPT | Mod: ,,, | Performed by: INTERNAL MEDICINE

## 2017-09-25 PROCEDURE — 99214 OFFICE O/P EST MOD 30 MIN: CPT | Mod: S$PBB,,, | Performed by: INTERNAL MEDICINE

## 2017-09-25 PROCEDURE — 99999 PR PBB SHADOW E&M-EST. PATIENT-LVL III: CPT | Mod: PBBFAC,,, | Performed by: INTERNAL MEDICINE

## 2017-09-25 PROCEDURE — 1126F AMNT PAIN NOTED NONE PRSNT: CPT | Mod: ,,, | Performed by: INTERNAL MEDICINE

## 2017-09-25 PROCEDURE — 3078F DIAST BP <80 MM HG: CPT | Mod: ,,, | Performed by: INTERNAL MEDICINE

## 2017-09-25 NOTE — TELEPHONE ENCOUNTER
----- Message from JAZMIN Fernando sent at 9/25/2017  3:32 PM CDT -----  I have reviewed all lab results which are normal or stable. Please inform the patient.

## 2017-09-25 NOTE — PROGRESS NOTES
Subjective:       Patient ID: Sarai Bashir is a 72 y.o. female.    Chief Complaint: No chief complaint on file.    HPI:   Dx with osteoporosis, CVA, hx Guillian [place] dx in 7 years ago, follows with pcp only ,   CVA, had some cardiac exams and normal.taking cholesterol meds ,a nd has HTN morbid obesity , attempting to lose weight, some mild anemia on and off with mild ckd   wa to start prolia but had dentla work to complete for osteopenia so we delayed the prolia and she is 3 months out and here to proceed    REVIEW OF SYSTEMS:     CONSTITUTIONAL: The patient denies any weight change. There is no apparent    change in appetite, fever, night sweats, headaches, fatigue, dizziness, or    weakness.      SKIN: Denies rash, issues with nails, non-healing sores, bleeding, blotching    skin or abnormal bruising. Denies new moles or changes to existing moles.      BREASTS: There is no swelling around breasts or nipple discharge.    EYES: Denies eye pain, blurred vision, swelling, redness or discharge.      ENT AND MOUTH:has abscess to teeth, delayed her prolia that waas suggestion  CARDIOVASCULAR: Denies chest pain, discomfort or palpitations. Denies neck    swelling or episodes of passing out.      RESPIRATORY: Denies cough, sputum production, blood in sputum, and denies    shortness of breath.      GI: Denies trouble swallowing, indigestion, heartburn, abdominal pain, nausea,    vomiting, diarrhea, altered bowel habits, blood in stool, discoloration of    stools, change in nature of stool, bloating, increased abdominal girth.      GENITOURINARY: No discharge. No pelvic pain or lumps. No rash around groin or  lesions. No urinary frequency, hesitation, painful urination or blood in    urine. Denies incontinence. No problems with intercourse.      MUSCULOSKELETAL: Denies neck or back pain. Denies weakness in arms or legs,    joint problems or distended inflamed veins in legs. Denies swelling or abnormal  glands.       NEUROLOGICAL: Denies tingling, numbness, altered mentation changes to nerve    function in the face, weakness to one or both of the body. Denies changes to    gait and denies multiple falls or accidents.      PHYSICAL EXAM:     Wt Readings from Last 3 Encounters:   09/25/17 133.8 kg (294 lb 15.6 oz)   08/18/17 130.8 kg (288 lb 5.8 oz)   07/26/17 130.8 kg (288 lb 5.8 oz)     Temp Readings from Last 3 Encounters:   09/25/17 98.3 °F (36.8 °C) (Oral)   05/22/17 97.8 °F (36.6 °C) (Oral)   03/13/17 98.4 °F (36.9 °C) (Oral)     BP Readings from Last 3 Encounters:   09/25/17 136/63   08/18/17 (!) 153/68   07/26/17 (!) 146/67     Pulse Readings from Last 3 Encounters:   09/25/17 71   08/18/17 74   07/26/17 67   GENERAL: Comfortable looking patient. Patient is in no distress.  Awake, alert and oriented to time, person and place.  No anxiety, or agitation.      HEENT: Normal conjunctivae and eyelids. WNL.  PERRLA 3 to 4 mm. No icterus, no pallor, no congestion, and no discharge noted.     NECK:  Supple. Trachea is central.  No crepitus.  No JVD or masses.    RESPIRATORY:  No intercostal retractions.  No dullness to percussion.  Chest is clear to auscultation.  No rales, rhonchi or wheezes.  No crepitus.  Good air entry bilaterally.    CARDIOVASCULAR:  S1 and S2 are normally heard without murmurs or gallops.  All peripheral pulses are present.    ABDOMEN:  Normal abdomen.  No hepatosplenomegaly.  No free fluid.  Bowel sounds are present.  No hernia noted. No masses.  No rebound or tenderness.  No guarding or rigidity.  Umbilicus is midline.    LYMPHATICS:  No axillary, cervical, supraclavicular, submental, or inguinal lymphadenopathy.    SKIN/MUSCULOSKELETAL:  There is no evidence of excoriation marks or ecchmosis.  No rashes.  No cyanosis.  No clubbing.  No joint or skeletal deformities noted.  Normal range of motion.    NEUROLOGIC:  Higher functions are appropriate.  No cranial nerve deficits.  Normal denis.  Normal  strength.  Motor and sensory functions are normal.  Deep tendon reflexes are normal.    GENITAL/RECTAL:  Exams are deferred.      Laboratory:     CBC:  Lab Results   Component Value Date    WBC 7.18 05/22/2017    RBC 4.54 05/22/2017    HGB 13.0 05/22/2017    HCT 39.5 05/22/2017    MCV 87 05/22/2017    MCH 28.6 05/22/2017    MCHC 32.9 05/22/2017    RDW 13.8 05/22/2017     05/22/2017    MPV 12.4 05/22/2017    GRAN 4.9 05/22/2017    GRAN 68.9 05/22/2017    LYMPH 1.8 05/22/2017    LYMPH 24.4 05/22/2017    MONO 0.4 05/22/2017    MONO 5.0 05/22/2017    EOS 0.1 05/22/2017    BASO 0.01 05/22/2017    EOSINOPHIL 1.5 05/22/2017    BASOPHIL 0.1 05/22/2017       BMP: BMP  Lab Results   Component Value Date     09/25/2017    K 4.2 09/25/2017     09/25/2017    CO2 30 (H) 09/25/2017    BUN 11 09/25/2017    CREATININE 0.9 09/25/2017    CALCIUM 9.9 09/25/2017    ANIONGAP 9 09/25/2017    ESTGFRAFRICA >60 09/25/2017    EGFRNONAA >60 09/25/2017       LFT:   Lab Results   Component Value Date    ALT 19 09/25/2017    AST 22 09/25/2017    ALKPHOS 86 09/25/2017    BILITOT 0.7 09/25/2017         Assessment/Plan:       Osteopenia: proceed with prolia, pt already on 1200 mg of calcium, she will notify dentist of being on bisphosponate, no dental procedures planned for now , last one 3 months ago has a partial  ckd anemia: both resolved, cont to monitor   htn / obesity , cont weight loss and mx   rtc 6 months for next prolia with cbc, cmp  Will hold off prolia till cleared from dental . rtc4 months if dental work is completed, please call pt to check on when dental work is being complteed when preparing chart for next visit

## 2017-09-26 ENCOUNTER — DOCUMENTATION ONLY (OUTPATIENT)
Dept: FAMILY MEDICINE | Facility: CLINIC | Age: 72
End: 2017-09-26

## 2017-09-26 NOTE — PROGRESS NOTES
Pre-Visit Chart Review  For Appointment Scheduled on (date) 10/9/17    Health Maintenance Due   Topic Date Due    Hepatitis C Screening  1945    Pneumococcal (65+) (1 of 2 - PCV13) 03/23/2012    Influenza Vaccine  08/01/2017

## 2017-10-03 ENCOUNTER — DOCUMENTATION ONLY (OUTPATIENT)
Dept: HEMATOLOGY/ONCOLOGY | Facility: CLINIC | Age: 72
End: 2017-10-03

## 2017-10-18 ENCOUNTER — OFFICE VISIT (OUTPATIENT)
Dept: FAMILY MEDICINE | Facility: CLINIC | Age: 72
End: 2017-10-18
Payer: MEDICARE

## 2017-10-18 VITALS
TEMPERATURE: 98 F | HEIGHT: 64 IN | OXYGEN SATURATION: 95 % | RESPIRATION RATE: 20 BRPM | HEART RATE: 78 BPM | SYSTOLIC BLOOD PRESSURE: 133 MMHG | DIASTOLIC BLOOD PRESSURE: 71 MMHG | BODY MASS INDEX: 50.02 KG/M2 | WEIGHT: 293 LBS

## 2017-10-18 DIAGNOSIS — E66.01 MORBID OBESITY WITH BODY MASS INDEX (BMI) OF 50.0 TO 59.9 IN ADULT: ICD-10-CM

## 2017-10-18 DIAGNOSIS — R60.0 BILATERAL LOWER EXTREMITY EDEMA: ICD-10-CM

## 2017-10-18 DIAGNOSIS — M54.50 ACUTE BILATERAL LOW BACK PAIN WITHOUT SCIATICA: Primary | ICD-10-CM

## 2017-10-18 DIAGNOSIS — R31.21 ASYMPTOMATIC MICROSCOPIC HEMATURIA: ICD-10-CM

## 2017-10-18 LAB
BILIRUB SERPL-MCNC: NEGATIVE MG/DL
BILIRUB UR QL STRIP: NEGATIVE
BLOOD, POC UA: ABNORMAL
CLARITY UR REFRACT.AUTO: CLEAR
COLOR UR AUTO: YELLOW
GLUCOSE UR QL STRIP: NEGATIVE
GLUCOSE UR QL STRIP: NORMAL
HGB UR QL STRIP: NEGATIVE
KETONES UR QL STRIP: NEGATIVE
KETONES UR QL STRIP: NEGATIVE
LEUKOCYTE ESTERASE UR QL STRIP: NEGATIVE
LEUKOCYTE ESTERASE URINE, POC: NEGATIVE
NITRITE UR QL STRIP: NEGATIVE
NITRITE, POC UA: NEGATIVE
PH UR STRIP: 5 [PH] (ref 5–8)
PH, POC UA: 5
PROT UR QL STRIP: NEGATIVE
PROTEIN, POC: ABNORMAL
SP GR UR STRIP: 1.01 (ref 1–1.03)
SPECIFIC GRAVITY, POC UA: 1.01
URN SPEC COLLECT METH UR: NORMAL
UROBILINOGEN UR STRIP-ACNC: NEGATIVE EU/DL
UROBILINOGEN, POC UA: NORMAL

## 2017-10-18 PROCEDURE — 99214 OFFICE O/P EST MOD 30 MIN: CPT | Mod: S$PBB,,, | Performed by: NURSE PRACTITIONER

## 2017-10-18 PROCEDURE — 99999 PR PBB SHADOW E&M-EST. PATIENT-LVL III: CPT | Mod: PBBFAC,,, | Performed by: NURSE PRACTITIONER

## 2017-10-18 PROCEDURE — 99213 OFFICE O/P EST LOW 20 MIN: CPT | Mod: PBBFAC,PO | Performed by: NURSE PRACTITIONER

## 2017-10-18 PROCEDURE — 81003 URINALYSIS AUTO W/O SCOPE: CPT

## 2017-10-18 PROCEDURE — 81000 URINALYSIS NONAUTO W/SCOPE: CPT | Mod: PBBFAC,PO | Performed by: NURSE PRACTITIONER

## 2017-10-18 RX ORDER — BACLOFEN 10 MG/1
10 TABLET ORAL 2 TIMES DAILY PRN
Qty: 60 TABLET | Refills: 0 | Status: SHIPPED | OUTPATIENT
Start: 2017-10-18

## 2017-10-18 RX ORDER — NAPROXEN 500 MG/1
500 TABLET ORAL 2 TIMES DAILY PRN
Qty: 30 TABLET | Refills: 1 | Status: SHIPPED | OUTPATIENT
Start: 2017-10-18 | End: 2019-04-24 | Stop reason: SDUPTHER

## 2017-10-18 NOTE — PATIENT INSTRUCTIONS
Back Pain (Acute or Chronic)    Back pain is one of the most common problems. The good news is that most people feel better in 1 to 2 weeks, and most of the rest in 1 to 2 months. Most people can remain active.  People experience and describe pain differently; not everyone is the same.  · The pain can be sharp, stabbing, shooting, aching, cramping or burning.  · Movement, standing, bending, lifting, sitting, or walking may worsen pain.  · It can be localized to one spot or area, or it can be more generalized.  · It can spread or radiate upwards, to the front, or go down your arms or legs (sciatica).  · It can cause muscle spasm.  Most of the time, mechanical problems with the muscles or spine cause the pain. Mechanical problems are usually caused by an injury to the muscles or ligaments. While illness can cause back pain, it is usually not caused by a serious illness. Mechanical problems include:   · Physical activity such as sports, exercise, work, or normal activity  · Overexertion, lifting, pushing, pulling incorrectly or too aggressively  · Sudden twisting, bending, or stretching from an accident, or accidental movement  · Poor posture  · Stretching or moving wrong, without noticing pain at the time  · Poor coordination, lack of regular exercise (check with your doctor about this)  · Spinal disc disease or arthritis  · Stress  Pain can also be related to pregnancy, or illness like appendicitis, bladder or kidney infections, pelvic infections, and many other things.  Acute back pain usually gets better in 1 to 2 weeks. Back pain related to disk disease, arthritis in the spinal joints or spinal stenosis (narrowing of the spinal canal) can become chronic and last for months or years.  Unless you had a physical injury (for example, a car accident or fall) X-rays are usually not needed for the initial evaluation of back pain. If pain continues and does not respond to medical treatment, X-rays and other tests may be  needed.  Home care  Try these home care recommendations:  · When in bed, try to find a position of comfort. A firm mattress is best. Try lying flat on your back with pillows under your knees. You can also try lying on your side with your knees bent up towards your chest and a pillow between your knees.  · At first, do not try to stretch out the sore spots. If there is a strain, it is not like the good soreness you get after exercising without an injury. In this case, stretching may make it worse.  · Avoid prolong sitting, long car rides, or travel. This puts more stress on the lower back than standing or walking.  · During the first 24 to 72 hours after an acute injury or flare up of chronic back pain, apply an ice pack to the painful area for 20 minutes and then remove it for 20 minutes. Do this over a period of 60 to 90 minutes or several times a day. This will reduce swelling and pain. Wrap the ice pack in a thin towel or plastic to protect your skin.  · You can start with ice, then switch to heat. Heat (hot shower, hot bath, or heating pad) reduces pain and works well for muscle spasms. Heat can be applied to the painful area for 20 minutes then remove it for 20 minutes. Do this over a period of 60 to 90 minutes or several times a day. Do not sleep on a heating pad. It can lead to skin burns or tissue damage.  · You can alternate ice and heat therapy. Talk with your doctor about the best treatment for your back pain.  · Therapeutic massage can help relax the back muscles without stretching them.  · Be aware of safe lifting methods and do not lift anything without stretching first.  Medicines  Talk to your doctor before using medicine, especially if you have other medical problems or are taking other medicines.  · You may use over-the-counter medicine as directed on the bottle to control pain, unless another pain medicine was prescribed. If you have chronic conditions like diabetes, liver or kidney disease,  stomach ulcers, or gastrointestinal bleeding, or are taking blood thinners, talk to your doctor before taking any medicine.  · Be careful if you are given a prescription medicines, narcotics, or medicine for muscle spasms. They can cause drowsiness, affect your coordination, reflexes, and judgement. Do not drive or operate heavy machinery.  Follow-up care  Follow up with your healthcare provider, or as advised.   A radiologist will review any X-rays that were taken. Your provide will notify you of any new findings that may affect your care.  Call 911  Call emergency services if any of the following occur:  · Trouble breathing  · Confusion  · Very drowsy or trouble awakening  · Fainting or loss of consciousness  · Rapid or very slow heart rate  · Loss of bowel or bladder control  When to seek medical advice  Call your healthcare provider right away if any of these occur:   · Pain becomes worse or spreads to your legs  · Weakness or numbness in one or both legs  · Numbness in the groin or genital area  Date Last Reviewed: 7/1/2016  © 9145-0819 [employer] [place]. [address] [place] [address], [place] [address]. All rights reserved. This information is not intended as a substitute for professional medical care. Always follow your healthcare professional's instructions.
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
## 2017-10-19 ENCOUNTER — TELEPHONE (OUTPATIENT)
Dept: FAMILY MEDICINE | Facility: CLINIC | Age: 72
End: 2017-10-19

## 2017-10-19 NOTE — TELEPHONE ENCOUNTER
----- Message from Neelam Chacon, LARY, NP-C sent at 10/19/2017  7:49 AM CDT -----  Please call patient and let her know that her urinalysis that was sent to the lab was negative for blood.  I did review her previous EHR and noted in 2013 she was seen by Dr. Alcantar, Urology and he did dictate in his note that she should be seen by urology yearly due to history of abnormal cells in urine.  She needs to make an appointment with him for follow up.  Thanks.

## 2017-10-19 NOTE — TELEPHONE ENCOUNTER
Patient was seen by Neelam Chacon NP on 10/18/17; patients urine was tested and cultured and showed blood on the urinalysis but not on the culture; patient has a history of urine showing abnormal cells ; patient needs to scheduled for an appointment in your office please.

## 2017-10-24 ENCOUNTER — OFFICE VISIT (OUTPATIENT)
Dept: UROLOGY | Facility: CLINIC | Age: 72
End: 2017-10-24
Payer: MEDICARE

## 2017-10-24 ENCOUNTER — TELEPHONE (OUTPATIENT)
Dept: ENDOCRINOLOGY | Facility: CLINIC | Age: 72
End: 2017-10-24

## 2017-10-24 ENCOUNTER — TELEPHONE (OUTPATIENT)
Dept: FAMILY MEDICINE | Facility: CLINIC | Age: 72
End: 2017-10-24

## 2017-10-24 ENCOUNTER — APPOINTMENT (OUTPATIENT)
Dept: LAB | Facility: HOSPITAL | Age: 72
End: 2017-10-24
Attending: NURSE PRACTITIONER
Payer: MEDICARE

## 2017-10-24 VITALS
SYSTOLIC BLOOD PRESSURE: 166 MMHG | TEMPERATURE: 98 F | BODY MASS INDEX: 50.02 KG/M2 | WEIGHT: 293 LBS | DIASTOLIC BLOOD PRESSURE: 82 MMHG | RESPIRATION RATE: 20 BRPM | HEART RATE: 68 BPM | HEIGHT: 64 IN

## 2017-10-24 DIAGNOSIS — R35.0 URINARY FREQUENCY: ICD-10-CM

## 2017-10-24 DIAGNOSIS — R10.9 FLANK PAIN: Primary | ICD-10-CM

## 2017-10-24 DIAGNOSIS — N32.81 OVERACTIVE BLADDER: ICD-10-CM

## 2017-10-24 LAB
BACTERIA #/AREA URNS HPF: ABNORMAL /HPF
BILIRUB SERPL-MCNC: ABNORMAL MG/DL
BLOOD URINE, POC: ABNORMAL
COLOR, POC UA: ABNORMAL
GLUCOSE UR QL STRIP: ABNORMAL
KETONES UR QL STRIP: ABNORMAL
LEUKOCYTE ESTERASE URINE, POC: ABNORMAL
MICROSCOPIC COMMENT: ABNORMAL
NITRITE, POC UA: ABNORMAL
PH, POC UA: 6
PROTEIN, POC: ABNORMAL
RBC #/AREA URNS HPF: 0 /HPF (ref 0–4)
SPECIFIC GRAVITY, POC UA: 1.01
SQUAMOUS #/AREA URNS HPF: 2 /HPF
UROBILINOGEN, POC UA: ABNORMAL
WBC #/AREA URNS HPF: 3 /HPF (ref 0–5)

## 2017-10-24 PROCEDURE — 81000 URINALYSIS NONAUTO W/SCOPE: CPT

## 2017-10-24 PROCEDURE — 87086 URINE CULTURE/COLONY COUNT: CPT

## 2017-10-24 PROCEDURE — 99215 OFFICE O/P EST HI 40 MIN: CPT | Mod: PBBFAC,PN | Performed by: NURSE PRACTITIONER

## 2017-10-24 PROCEDURE — 99999 PR PBB SHADOW E&M-EST. PATIENT-LVL V: CPT | Mod: PBBFAC,,, | Performed by: NURSE PRACTITIONER

## 2017-10-24 PROCEDURE — 99214 OFFICE O/P EST MOD 30 MIN: CPT | Mod: S$PBB,,, | Performed by: NURSE PRACTITIONER

## 2017-10-24 PROCEDURE — 81001 URINALYSIS AUTO W/SCOPE: CPT | Mod: PBBFAC,PN | Performed by: NURSE PRACTITIONER

## 2017-10-24 RX ORDER — OXYBUTYNIN CHLORIDE 10 MG/1
10 TABLET, EXTENDED RELEASE ORAL DAILY
Qty: 90 TABLET | Refills: 3 | Status: SHIPPED | OUTPATIENT
Start: 2017-10-24 | End: 2017-10-25 | Stop reason: SDUPTHER

## 2017-10-24 NOTE — TELEPHONE ENCOUNTER
----- Message from Samaria Kike sent at 10/24/2017 10:45 AM CDT -----  Contact: patient  Patient calling to request a referral to Urology. She is scheduled today at 1 pm with DIRK Ji. Please advise.  Call back   Thanks!

## 2017-10-24 NOTE — LETTER
October 24, 2017      Alex Green Jr., MD  [address] [facility] [address]           [facility]  [address] [place] [address] Dr. Quintero [address] [place] [id number]  Phone: [phone]  Fax: [phone]          Patient: Sarai Bashir   MR Number: [id number]   YOB: 1945   Date of Visit: 10/24/2017       Dear Dr. Alex Green Jr.:    Thank you for referring Sarai Bashir to me for evaluation. Attached you will find relevant portions of my assessment and plan of care.    If you have questions, please do not hesitate to call me. I look forward to following Sarai Bashir along with you.    Sincerely,    Ana Reed, [facility]    Enclosure  CC:  No Recipients    If you would like to receive this communication electronically, please contact [email or web address] or [phone] to request more information on [employer] Link access.    For providers and/or their staff who would like to refer a patient to [facility], please contact us through our one-stop-shop provider referral line, [facility], at [phone].    If you feel you have received this communication in error or would no longer like to receive these types of communications, please e-mail [email or web address]

## 2017-10-24 NOTE — PATIENT INSTRUCTIONS
Overactive Bladder Syndrome (OAB)     Normally, urine stays in the bladder until a person decides to release it. With OAB, the bladder muscles contract involuntarily, causing a sudden urge to urinate and even urine leakage.   When the bladder muscle contract or squeeze involuntarily, it is called overactive bladder syndrome. This causes an intense urge to urinate, known as urgency. Urgency can occur many times during the day and night. If urine leaks with the urgency, it is called urge incontinence.  A disease that affects the bladder nerves, such as multiple sclerosis, can cause overactive bladder syndrome. Other conditions, such as urinary tract infection (UTI) or prostate problems in men, can also lead to OAB. But the exact cause is often not known.  How is overactive bladder syndrome diagnosed?  Your healthcare provider will examine you and ask about your symptoms and health history. You may also have one or more of the following:  · Urine test to take samples of urine and have them checked for problems.  · Urinary diary to record how much fluid you take in and urinate out in a 3 day period.  · Bladder ultrasound to study the bladder as it empties. Ultrasound uses sound waves to create detailed images of the inside of the body.  · Cystoscopy to allow the healthcare provider to look for problems in the urinary tract. The test uses a thin, flexible scope called a cystoscope with a light and camera on the end. The scope is inserted into the urethra (the tube that carries urine out of the body).  · Urodynamic studies, a battery of tests designed to measure and record many aspects of urinary bladder function, including pressures, volume, and urine flow.  How is overactive bladder syndrome treated?  Treatment depends on the cause and severity of your OAB. Treatments may include the following:  · Changing urination habits may be suggested. For instance, your healthcare provider may suggest that you urinate as soon as  you feel the urge. You may also need to limit how much fluid you have during the day.  · Exercising your pelvic muscles can help strengthen muscles used during urination. These exercises are called Kegels. They involve enrico as if you were stopping your urine stream and tightening your rectum as if trying not to pass gas. Your healthcare provider can help you learn how to do Kegels.  · Biofeedback to help you learn to control the movement of your bladder muscles. Sensors are placed on your abdomen. They turn signals given off by your muscles into lines on a computer screen.  · Medicine may be given to relax the bladder muscle. Medicine can also help ease bladder contractions, which reduces the urge to urinate.  · Neuromodulation may be done if medicine and behavioral changes dont work. Electrical pulses are sent to the sacral nerves (nerves that affect the pelvic area). These pulses help relieve OAB and urge incontinence.  · Surgery to make the bladder larger may be done in severe cases.  With treatment, OAB can be managed. A condition, such as UTI, that has caused you to have OAB will be treated. Treatment may involve taking medicine for months or years. You may also need to make changes in your daily routine. This may include going to the bathroom more often than you think you need to. Or, you may need to cut back on caffeine and alcohol because these can make symptoms worse. Your healthcare provider can tell you more.     When to call your healthcare provider  Call the healthcare provider right away if you have any of the following:  · Fever of 100.4°F (38.0 °C) or higher   · No improvement with treatment  · Trouble urinating because of pain  · Back or abdominal pain   Date Last Reviewed: 1/1/2017 © 2000-2017 [employer]. [address] [place] [address], [place] [address]. All rights reserved. This information is not intended as a substitute for professional medical care. Always follow your  healthcare professional's instructions.

## 2017-10-24 NOTE — PROGRESS NOTES
[facility] Note  Staff: ITA Stanford    Referring provider and please cc:  PCP: Dr. Alex Green    Chief Complaint: Back pain, increased urinary frequency.    Subjective:        HPI: Sarai Bashir is a 72 y.o. female new patient with Urology re-establishing today presents with lower back pains and increased urinary frequency for the last two months.    The patient was last seen by Dr. Olmstead on 05/08/2013 with history of hematuria and abnormal cytology results.  A cystoscopy was performed   At last visit and showed following results:  FINDINGS:  URETHRA: open with no diverticulum, few inflammatory polyps at bladder neck  BLADDER: no lesions or stones  TRABEC:grade 1/2  URETERAL ORIFICES:nssp clear efflux  OTHER FINDINGS:none    Questions asked pt during office visit today:  DTF: Yes, NTF: gets up every 2-3 hours to use bathroom at night.  Urgency:Yes, incontinence with urgency? Yes - sometimes;   Incontinence with laughing or straining: No; bothersome: No  Dysuria:  None  Feel a bulge?No  G4, P 3, vaginal, one stillborn  Gross Hematuria:  Yes - in 2013 had workup done by Dr. Olmstead   History of UTI: No  Sexually active?: No    REVIEW OF SYSTEMS:  Review of Systems   Constitutional: Negative for chills, diaphoresis, fever and weight loss.   HENT: Negative for congestion, hearing loss, nosebleeds and sore throat.    Eyes: Negative for blurred vision and pain.   Respiratory: Negative for cough and wheezing.    Cardiovascular: Negative for chest pain, palpitations and leg swelling.   Gastrointestinal: Negative for abdominal pain, heartburn, nausea and vomiting.   Genitourinary: Positive for frequency. Negative for dysuria, flank pain, hematuria and urgency.   Musculoskeletal: Positive for back pain. Negative for joint pain, myalgias and neck pain.   Skin: Negative for itching and rash.   Neurological: Negative for dizziness, tremors, sensory change, seizures, loss of consciousness,  "weakness and headaches.   Endo/Heme/Allergies: Does not bruise/bleed easily.   Psychiatric/Behavioral: Negative for depression and suicidal ideas. The patient is not nervous/anxious.      PMHx:  Past Medical History:   Diagnosis Date    Allergy     Fatigue     History of Guillain-Loomis syndrome     HTN (hypertension)     Hyperlipidemia     Neuromuscular disorder     Obesity     KENTRELL (obstructive sleep apnea) 7/9/2013    Sickle cell trait 8/31/15    Urinary tract infection      Kidney stones: No    PSHx:  Past Surgical History:   Procedure Laterality Date    ADENOIDECTOMY      CHOLECYSTECTOMY      TONSILLECTOMY      TONSILLECTOMY, ADENOIDECTOMY, BILATERAL MYRINGOTOMY AND TUBES       Stents/Valves/Foreign Bodies: No  Cardiac Evaluation: No    Screening Studies  Colonoscopy: Last test was done 2015 no complications    Fam Hx:   malignancies: Unknown family history, all she knows is her mother had kidney "problems" and diagnosed with colon cancer.  Unsure in reference to father's side of family     Social History     Social History    Marital status:      Spouse name: N/A    Number of children: N/A    Years of education: N/A     Social History Main Topics    Smoking status: Never Smoker    Smokeless tobacco: Never Used    Alcohol use No    Drug use: No    Sexual activity: No     Other Topics Concern    None     Social History Narrative    None     Allergies:  Actonel [risedronate] and Fosamax [alendronate]    Medications: reviewed   Anticoagulation: Yes - Aspirin 81 mg one tablet daily    Objective:     Vitals:    10/24/17 1307   BP: (!) 166/82   Pulse: 68   Resp: 20   Temp: 98.4 °F (36.9 °C)     Physical Exam   Vitals reviewed.  Constitutional: She is oriented to person, place, and time. She appears well-developed and well-nourished.   HENT:   Head: Normocephalic and atraumatic.   Eyes: Conjunctivae and EOM are normal. Pupils are equal, round, and reactive to light.   Neck: Normal range " of motion. Neck supple.   Cardiovascular: Normal rate, regular rhythm, normal heart sounds and intact distal pulses.    Pulmonary/Chest: Effort normal and breath sounds normal.   Abdominal: Soft. Bowel sounds are normal.   Musculoskeletal: Normal range of motion.   Neurological: She is alert and oriented to person, place, and time. She has normal reflexes.   Skin: Skin is warm and dry.     Psychiatric: She has a normal mood and affect. Her behavior is normal. Judgment and thought content normal.     LABS REVIEW:  UA today: Color:Clear, Yellow  Spec. Grav.  1.010  PH  6.0  Trace of WBCs  Trace Blood    Cr:   Lab Results   Component Value Date    CREATININE 0.9 09/25/2017       Assessment:       1. Flank pain    2. Urinary frequency    3. Overactive bladder          Plan:     1.  UA Microscopic and urine culture to be performed.  2.  CT RSS to be performed to check   3.  We will increase the oxybutynin to 10 mg daily.    F/u with me in four weeks for recheck    [facility]: Declined    Ana Reed, SOFIAP-C

## 2017-10-25 ENCOUNTER — HOSPITAL ENCOUNTER (OUTPATIENT)
Dept: RADIOLOGY | Facility: HOSPITAL | Age: 72
Discharge: HOME | End: 2017-10-25
Attending: NURSE PRACTITIONER
Payer: MEDICARE

## 2017-10-25 DIAGNOSIS — R35.0 URINARY FREQUENCY: ICD-10-CM

## 2017-10-25 DIAGNOSIS — R10.9 FLANK PAIN: ICD-10-CM

## 2017-10-25 LAB — BACTERIA UR CULT: NORMAL

## 2017-10-25 PROCEDURE — 74176 CT ABD & PELVIS W/O CONTRAST: CPT | Mod: 26,,, | Performed by: RADIOLOGY

## 2017-10-25 PROCEDURE — 74176 CT ABD & PELVIS W/O CONTRAST: CPT | Mod: TC

## 2017-10-25 RX ORDER — OXYBUTYNIN CHLORIDE 10 MG/1
10 TABLET, EXTENDED RELEASE ORAL DAILY
Qty: 90 TABLET | Refills: 3 | Status: SHIPPED | OUTPATIENT
Start: 2017-10-25 | End: 2018-01-23

## 2017-10-26 DIAGNOSIS — Z11.59 NEED FOR HEPATITIS C SCREENING TEST: ICD-10-CM

## 2017-11-06 ENCOUNTER — LAB VISIT (OUTPATIENT)
Dept: LAB | Facility: HOSPITAL | Age: 72
End: 2017-11-06
Attending: FAMILY MEDICINE
Payer: MEDICARE

## 2017-11-06 ENCOUNTER — OFFICE VISIT (OUTPATIENT)
Dept: ENDOCRINOLOGY | Facility: CLINIC | Age: 72
End: 2017-11-06
Payer: MEDICARE

## 2017-11-06 VITALS
WEIGHT: 293 LBS | RESPIRATION RATE: 18 BRPM | DIASTOLIC BLOOD PRESSURE: 83 MMHG | SYSTOLIC BLOOD PRESSURE: 142 MMHG | BODY MASS INDEX: 50.02 KG/M2 | HEIGHT: 64 IN | HEART RATE: 68 BPM

## 2017-11-06 DIAGNOSIS — E04.1 NODULAR THYROID DISEASE: Primary | ICD-10-CM

## 2017-11-06 DIAGNOSIS — E78.00 PURE HYPERCHOLESTEROLEMIA: ICD-10-CM

## 2017-11-06 DIAGNOSIS — M80.00XA AGE-RELATED OSTEOPOROSIS WITH CURRENT PATHOLOGICAL FRACTURE, INITIAL ENCOUNTER: ICD-10-CM

## 2017-11-06 DIAGNOSIS — G47.33 OSA (OBSTRUCTIVE SLEEP APNEA): ICD-10-CM

## 2017-11-06 DIAGNOSIS — I10 ESSENTIAL HYPERTENSION: ICD-10-CM

## 2017-11-06 DIAGNOSIS — E04.1 NODULAR THYROID DISEASE: ICD-10-CM

## 2017-11-06 LAB
T3 SERPL-MCNC: 97 NG/DL
T4 FREE SERPL-MCNC: 0.84 NG/DL
TSH SERPL DL<=0.005 MIU/L-ACNC: 0.64 UIU/ML

## 2017-11-06 PROCEDURE — 36415 COLL VENOUS BLD VENIPUNCTURE: CPT | Mod: PO

## 2017-11-06 PROCEDURE — 99214 OFFICE O/P EST MOD 30 MIN: CPT | Mod: PBBFAC,PO | Performed by: INTERNAL MEDICINE

## 2017-11-06 PROCEDURE — 84443 ASSAY THYROID STIM HORMONE: CPT

## 2017-11-06 PROCEDURE — 84439 ASSAY OF FREE THYROXINE: CPT

## 2017-11-06 PROCEDURE — 84480 ASSAY TRIIODOTHYRONINE (T3): CPT

## 2017-11-06 PROCEDURE — 99999 PR PBB SHADOW E&M-EST. PATIENT-LVL IV: CPT | Mod: PBBFAC,,, | Performed by: INTERNAL MEDICINE

## 2017-11-06 PROCEDURE — 99214 OFFICE O/P EST MOD 30 MIN: CPT | Mod: S$PBB,,, | Performed by: INTERNAL MEDICINE

## 2017-11-06 PROCEDURE — 86800 THYROGLOBULIN ANTIBODY: CPT
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
## 2017-11-07 ENCOUNTER — OFFICE VISIT (OUTPATIENT)
Dept: UROLOGY | Facility: CLINIC | Age: 72
End: 2017-11-07
Payer: MEDICARE

## 2017-11-07 ENCOUNTER — HOSPITAL ENCOUNTER (OUTPATIENT)
Dept: RADIOLOGY | Facility: CLINIC | Age: 72
Discharge: HOME | End: 2017-11-07
Attending: INTERNAL MEDICINE
Payer: MEDICARE

## 2017-11-07 VITALS
HEART RATE: 86 BPM | RESPIRATION RATE: 17 BRPM | HEIGHT: 64 IN | SYSTOLIC BLOOD PRESSURE: 131 MMHG | DIASTOLIC BLOOD PRESSURE: 70 MMHG | WEIGHT: 293 LBS | BODY MASS INDEX: 50.02 KG/M2

## 2017-11-07 DIAGNOSIS — E04.9 GOITER: ICD-10-CM

## 2017-11-07 DIAGNOSIS — R35.0 URINARY FREQUENCY: Primary | ICD-10-CM

## 2017-11-07 DIAGNOSIS — E04.1 NODULAR THYROID DISEASE: ICD-10-CM

## 2017-11-07 LAB
BILIRUB SERPL-MCNC: ABNORMAL MG/DL
BLOOD URINE, POC: ABNORMAL
COLOR, POC UA: YELLOW
GLUCOSE UR QL STRIP: ABNORMAL
KETONES UR QL STRIP: ABNORMAL
LEUKOCYTE ESTERASE URINE, POC: ABNORMAL
NITRITE, POC UA: ABNORMAL
PH, POC UA: 6
PROTEIN, POC: ABNORMAL
SPECIFIC GRAVITY, POC UA: 1.01
UROBILINOGEN, POC UA: ABNORMAL

## 2017-11-07 PROCEDURE — 99999 PR PBB SHADOW E&M-EST. PATIENT-LVL IV: CPT | Mod: PBBFAC,,, | Performed by: NURSE PRACTITIONER

## 2017-11-07 PROCEDURE — 76536 US EXAM OF HEAD AND NECK: CPT | Mod: TC,PO

## 2017-11-07 PROCEDURE — 99213 OFFICE O/P EST LOW 20 MIN: CPT | Mod: S$PBB,,, | Performed by: NURSE PRACTITIONER

## 2017-11-07 PROCEDURE — 81001 URINALYSIS AUTO W/SCOPE: CPT | Mod: PBBFAC,PN | Performed by: NURSE PRACTITIONER

## 2017-11-07 PROCEDURE — 99214 OFFICE O/P EST MOD 30 MIN: CPT | Mod: PBBFAC,PN,25 | Performed by: NURSE PRACTITIONER

## 2017-11-07 PROCEDURE — 76536 US EXAM OF HEAD AND NECK: CPT | Mod: 26,,, | Performed by: RADIOLOGY

## 2017-11-07 NOTE — PROGRESS NOTES
[facility] Note  Staff: TIA Stanford    Referring provider and please cc:   PCP: Dr. Alex Green    Chief Complaint: Follow-up: Urinary frequency, flank pain    Subjective:        HPI: Sarai Bashir is a 72 y.o. female presents today for routine recheck.  Pt was last seen by me on 10/24/17 for back pain and increased urinary frequency for last 2 months.  I had increased her oxybutynin to 10 mg daily and her urinary frequency has improved by over 50-60% especially during the night.    Urine Culture on 10/24/17 showed no growth for infection.  Microscopic UA showed the following:  No RBCs  3 WBCs  Few Bacteria  2 Squam Epith    CT RSS performed on 10/25/2017:  IMPRESSION:  The kidneys demonstrate no stones or hydronephrosis. No ureteral stones are identified.  No acute abdominopelvic process. No evidence for urolithiasis.  Cholecystectomy.  Small hernia.  Left hepatic lobe cyst.    REVIEW OF SYSTEMS:  Review of Systems   Constitutional: Negative for chills, diaphoresis, fever and weight loss.   HENT: Negative for congestion, hearing loss, nosebleeds and sore throat.    Eyes: Negative for blurred vision and pain.   Respiratory: Negative for cough and wheezing.    Cardiovascular: Negative for chest pain, palpitations and leg swelling.   Gastrointestinal: Negative for abdominal pain, heartburn, nausea and vomiting.   Genitourinary: Positive for frequency. Negative for dysuria, flank pain, hematuria and urgency.   Musculoskeletal: Negative for back pain, joint pain, myalgias and neck pain.   Skin: Negative for itching and rash.   Neurological: Negative for dizziness, tremors, sensory change, seizures, loss of consciousness, weakness and headaches.   Endo/Heme/Allergies: Does not bruise/bleed easily.   Psychiatric/Behavioral: Negative for depression and suicidal ideas. The patient is not nervous/anxious.      PMHx:  Past Medical History:   Diagnosis Date    Allergy     Fatigue      History of Guillain-Oakwood syndrome     HTN (hypertension)     Hyperlipidemia     Neuromuscular disorder     Obesity     KENTRELL (obstructive sleep apnea) 7/9/2013    Sickle cell trait 8/31/15    Urinary tract infection      PSHx:  Past Surgical History:   Procedure Laterality Date    ADENOIDECTOMY      CHOLECYSTECTOMY      TONSILLECTOMY      TONSILLECTOMY, ADENOIDECTOMY, BILATERAL MYRINGOTOMY AND TUBES       Social History     Social History    Marital status:      Spouse name: N/A    Number of children: N/A    Years of education: N/A     Social History Main Topics    Smoking status: Never Smoker    Smokeless tobacco: Never Used    Alcohol use No    Drug use: No    Sexual activity: No     Other Topics Concern    None     Social History Narrative    None     Allergies:  Actonel [risedronate] and Fosamax [alendronate]    Medications: reviewed   Objective:     Vitals:    11/07/17 0947   BP: 131/70   Pulse: 86   Resp: 17     Physical Exam   Vitals reviewed.  Constitutional: She is oriented to person, place, and time. She appears well-developed and well-nourished.   HENT:   Head: Normocephalic and atraumatic.   Eyes: Conjunctivae and EOM are normal. Pupils are equal, round, and reactive to light.   Neck: Normal range of motion. Neck supple.   Cardiovascular: Normal rate, regular rhythm, normal heart sounds and intact distal pulses.    Pulmonary/Chest: Effort normal and breath sounds normal.   Abdominal: Soft. Bowel sounds are normal.   Musculoskeletal: Normal range of motion.   Neurological: She is alert and oriented to person, place, and time. She has normal reflexes.   Skin: Skin is warm and dry.     Psychiatric: She has a normal mood and affect. Her behavior is normal. Judgment and thought content normal.     LABS REVIEW:  UA today: Color:Clear, Yellow  Spec. Grav.  1.015  PH  5.0  Trace of Blood    Cr:   Lab Results   Component Value Date    CREATININE 0.9 09/25/2017       Assessment:        1. Urinary frequency          Plan:     1.  Continue Oxybutynin 10 mg one tablet daily as previously directed for her urinary frequency.    F/u with me in 2 months for recheck    [facility]: Declined    Ana Reed, SOFIAP-C

## 2017-11-08 LAB
THRYOGLOBULIN INTERPRETATION: ABNORMAL
THYROGLOB AB SERPL-ACNC: <1.8 IU/ML
THYROGLOB SERPL-MCNC: 47 NG/ML

## 2018-01-09 ENCOUNTER — OFFICE VISIT (OUTPATIENT)
Dept: UROLOGY | Facility: CLINIC | Age: 73
End: 2018-01-09
Payer: MEDICARE

## 2018-01-09 VITALS
BODY MASS INDEX: 50.02 KG/M2 | WEIGHT: 293 LBS | SYSTOLIC BLOOD PRESSURE: 132 MMHG | HEART RATE: 76 BPM | DIASTOLIC BLOOD PRESSURE: 67 MMHG | TEMPERATURE: 99 F | HEIGHT: 64 IN

## 2018-01-09 DIAGNOSIS — R35.0 URINARY FREQUENCY: Primary | ICD-10-CM

## 2018-01-09 DIAGNOSIS — R32 URINARY INCONTINENCE IN FEMALE: ICD-10-CM

## 2018-01-09 LAB
BILIRUB SERPL-MCNC: NORMAL MG/DL
BLOOD URINE, POC: NORMAL
COLOR, POC UA: NORMAL
GLUCOSE UR QL STRIP: NORMAL
KETONES UR QL STRIP: NORMAL
LEUKOCYTE ESTERASE URINE, POC: NORMAL
NITRITE, POC UA: NORMAL
PH, POC UA: 6
PROTEIN, POC: NORMAL
SPECIFIC GRAVITY, POC UA: 1015
UROBILINOGEN, POC UA: NORMAL

## 2018-01-09 PROCEDURE — 99215 OFFICE O/P EST HI 40 MIN: CPT | Mod: PBBFAC,PN | Performed by: NURSE PRACTITIONER

## 2018-01-09 PROCEDURE — 99999 PR PBB SHADOW E&M-EST. PATIENT-LVL V: CPT | Mod: PBBFAC,,, | Performed by: NURSE PRACTITIONER

## 2018-01-09 PROCEDURE — 81001 URINALYSIS AUTO W/SCOPE: CPT | Mod: PBBFAC,PN | Performed by: NURSE PRACTITIONER

## 2018-01-09 PROCEDURE — 99213 OFFICE O/P EST LOW 20 MIN: CPT | Mod: S$PBB,,, | Performed by: NURSE PRACTITIONER

## 2018-01-09 RX ORDER — OXYBUTYNIN CHLORIDE 5 MG/1
5 TABLET, EXTENDED RELEASE ORAL DAILY
Qty: 90 TABLET | Refills: 3 | Status: SHIPPED | OUTPATIENT
Start: 2018-01-09 | End: 2018-03-15 | Stop reason: SDUPTHER

## 2018-01-09 NOTE — PATIENT INSTRUCTIONS
Treating Incontinence in Women with Medicine    Urinary incontinence is the leaking of urine from the bladder. In some cases, medicine can reduce or stop the leaking. It is mainly given for urge incontinence. Your healthcare provider will talk with you about your options. Make sure to ask what side effects to expect.  Below are some types of medicines that may help with urge incontinence.  Types of medicine  · Anticholinergics. These may increase how much urine the bladder can hold. They may also help relax bladder muscles.  · Estrogen. This may help improve muscle tone in the urethra and bladder.  · Antibiotics. These are used to treat urinary tract infections.  · Botulinum toxin. Injection of botulinum toxin into the bladder muscle is an option when other medicines are not effective.  Tips for taking medicine  · Take your medicine on time and as your healthcare provider tell you to.  · Tell your healthcare provider if you have any side effects, your dosage may be adjusted if necessary.  · Be patient. It may take time to find the right dose for you.  · Keep a list of the medicines you take. Show it to your healthcare provider and pharmacist before you buy over-the-counter medicines.   Date Last Reviewed: 1/1/2017  © 4299-1182 [employer]. [address] [place] [address], [place] [address]. All rights reserved. This information is not intended as a substitute for professional medical care. Always follow your healthcare professional's instructions.

## 2018-01-09 NOTE — PROGRESS NOTES
[facility] Progress Note  Staff: TIA Stanford    Chief Complaint:   Chief Complaint   Patient presents with    Follow-up    Urinary Frequency      HPI: Sarai Bashir is a 72 y.o. female here for routine recheck.  Today, pt denies hematuria, dysuria, or problems with emptying her bladder.  She is doing well since last ov with no new medical changes.    Last seen: by me on 11/07/2017 for urinary frequency, urgency and incontinence issues.    Urologic meds: Pt is currently on the following medication regimen for her lower urinary tract symptoms and tolerating this with no problems:  -Oxybutynin XL 10 mg one tablet in the am  & Oxybutynin XL 5 mg one tablet in the evening.    The patient states today she feels great, her symptoms have improved greatly on this med regimen and is very happy that she is able to sleep through the night without getting up several times to urinate.    The patient was last seen by Dr. Olmstead on 05/08/2013 with history of hematuria and abnormal cytology results.  A cystoscopy was performed   At last visit and showed following results:  FINDINGS:  URETHRA: open with no diverticulum, few inflammatory polyps at bladder neck  BLADDER: no lesions or stones  TRABEC:grade 1/2  URETERAL ORIFICES:nssp clear efflux  OTHER FINDINGS:none    Anticoagulant meds: Aspirin 81 mg one tablet daily.  Naproxen prn  Vitamin E 400 one tablet daily    Relevant Labs:   (date: 09/25/2017) BUN/Cr: 11/0.9    UA today:    Color:  Clear, Yellow  Spec. Grav. 1.015  PH  6.0  Negative for leukocytes, nitrites, protein, glucose, ketones, bilirubin, and blood.    Rads: CT RSS performed on 10/25/2017:  IMPRESSION:  No acute abdominopelvic process. No evidence for urolithiasis.  Cholecystectomy.  Small hernia.  Left hepatic lobe cyst.    Review of Systems   Constitutional: Negative for chills, diaphoresis, fever and weight loss.   HENT: Negative for congestion, hearing loss, nosebleeds  and sore throat.    Eyes: Negative for blurred vision and pain.   Respiratory: Negative for cough and wheezing.    Cardiovascular: Negative for chest pain, palpitations and leg swelling.   Gastrointestinal: Negative for abdominal pain, heartburn, nausea and vomiting.   Genitourinary: Positive for flank pain, frequency and urgency. Negative for dysuria and hematuria.        History of the following complaints:   Musculoskeletal: Negative for back pain, joint pain, myalgias and neck pain.   Skin: Negative for itching and rash.   Neurological: Negative for dizziness, tremors, sensory change, seizures, loss of consciousness, weakness and headaches.   Endo/Heme/Allergies: Does not bruise/bleed easily.   Psychiatric/Behavioral: Negative for depression and suicidal ideas. The patient is not nervous/anxious.      Physical Exam   Vitals reviewed.  Constitutional: She is oriented to person, place, and time. She appears well-developed and well-nourished.   HENT:   Head: Normocephalic and atraumatic.   Eyes: Conjunctivae and EOM are normal. Pupils are equal, round, and reactive to light.   Neck: Normal range of motion. Neck supple.   Cardiovascular: Normal rate, regular rhythm, normal heart sounds and intact distal pulses.    Pulmonary/Chest: Effort normal and breath sounds normal.   Abdominal: Soft. Bowel sounds are normal.   Musculoskeletal: Normal range of motion.   Neurological: She is alert and oriented to person, place, and time. She has normal reflexes.   Skin: Skin is warm and dry.     Psychiatric: She has a normal mood and affect. Her behavior is normal. Judgment and thought content normal.     A) Sarai Bashir is a 72 y.o. female with   1. Urinary frequency    2. Urinary incontinence in female      Plan)   1. Continue Oxybutynin XL 10 mg one tablet in am and 5 mg in the evenings as previously prescribed for a total of 15 mg daily.    F/up with me in six months.    I have spent 15 minutes with this patient and  over 50% of visit was spent counseling with the patient.    Ana MATTA-C

## 2018-03-15 ENCOUNTER — TELEPHONE (OUTPATIENT)
Dept: UROLOGY | Facility: CLINIC | Age: 73
End: 2018-03-15

## 2018-03-15 RX ORDER — OXYBUTYNIN CHLORIDE 5 MG/1
5 TABLET, EXTENDED RELEASE ORAL DAILY
Qty: 90 TABLET | Refills: 3 | Status: SHIPPED | OUTPATIENT
Start: 2018-03-15 | End: 2018-06-13

## 2018-03-15 RX ORDER — OXYBUTYNIN CHLORIDE 10 MG/1
10 TABLET, EXTENDED RELEASE ORAL DAILY
Qty: 90 TABLET | Refills: 3 | Status: SHIPPED | OUTPATIENT
Start: 2018-03-15 | End: 2019-04-22 | Stop reason: SDUPTHER

## 2018-03-15 NOTE — TELEPHONE ENCOUNTER
----- Message from Lona Valencia RT sent at 3/15/2018  2:01 PM CDT -----  Contact: pt    pt , requesting a call back soon to inform a discrepancy with her medication being refilled: Oxybutynin chloride, 10 mg thanks.

## 2018-03-15 NOTE — TELEPHONE ENCOUNTER
Spoke with patient, message given to NP to refill the med and she wants it to go the her mail order pharmacy, patient verbally understood.

## 2018-03-21 DIAGNOSIS — I10 ESSENTIAL HYPERTENSION: ICD-10-CM

## 2018-03-21 RX ORDER — AMLODIPINE BESYLATE 5 MG/1
5 TABLET ORAL DAILY
Qty: 90 TABLET | Refills: 3 | Status: SHIPPED | OUTPATIENT
Start: 2018-03-21 | End: 2019-03-21

## 2018-03-21 RX ORDER — METOPROLOL TARTRATE 50 MG/1
50 TABLET ORAL 2 TIMES DAILY
Qty: 180 TABLET | Refills: 3 | Status: SHIPPED | OUTPATIENT
Start: 2018-03-21 | End: 2018-07-09 | Stop reason: SDUPTHER

## 2018-03-21 NOTE — TELEPHONE ENCOUNTER
----- Message from Nay Ramos sent at 3/21/2018  8:54 AM CDT -----  Contact: self   Patient need a refill for rx metoprolol tartrate (LOPRESSOR) 50 MG tablet, and amlodipine (NORVASC) 5 MG tablet. Please send to meds by mail. Please call back at [phone] (home)        [facility] [id number]  [address] [id number]  Phone: [phone] Fax: [phone]

## 2018-04-27 ENCOUNTER — LAB VISIT (OUTPATIENT)
Dept: LAB | Facility: HOSPITAL | Age: 73
End: 2018-04-27
Attending: INTERNAL MEDICINE
Payer: MEDICARE

## 2018-04-27 ENCOUNTER — OFFICE VISIT (OUTPATIENT)
Dept: ENDOCRINOLOGY | Facility: CLINIC | Age: 73
End: 2018-04-27
Payer: MEDICARE

## 2018-04-27 VITALS
WEIGHT: 291.88 LBS | DIASTOLIC BLOOD PRESSURE: 73 MMHG | BODY MASS INDEX: 49.83 KG/M2 | RESPIRATION RATE: 18 BRPM | SYSTOLIC BLOOD PRESSURE: 143 MMHG | HEIGHT: 64 IN | HEART RATE: 58 BPM

## 2018-04-27 DIAGNOSIS — E04.9 GOITER: ICD-10-CM

## 2018-04-27 DIAGNOSIS — E88.810 DYSMETABOLIC SYNDROME: ICD-10-CM

## 2018-04-27 DIAGNOSIS — R73.9 HYPERGLYCEMIA: ICD-10-CM

## 2018-04-27 DIAGNOSIS — Z91.89 CARDIOVASCULAR RISK FACTOR: ICD-10-CM

## 2018-04-27 DIAGNOSIS — E66.01 MORBID OBESITY WITH BODY MASS INDEX (BMI) OF 50.0 TO 59.9 IN ADULT: ICD-10-CM

## 2018-04-27 DIAGNOSIS — E55.9 HYPOVITAMINOSIS D: ICD-10-CM

## 2018-04-27 DIAGNOSIS — Z78.0 POSTMENOPAUSAL: ICD-10-CM

## 2018-04-27 DIAGNOSIS — E06.9 THYROIDITIS: ICD-10-CM

## 2018-04-27 DIAGNOSIS — E04.1 NODULAR THYROID DISEASE: Primary | ICD-10-CM

## 2018-04-27 DIAGNOSIS — M80.00XA AGE-RELATED OSTEOPOROSIS WITH CURRENT PATHOLOGICAL FRACTURE, INITIAL ENCOUNTER: ICD-10-CM

## 2018-04-27 DIAGNOSIS — E78.00 PURE HYPERCHOLESTEROLEMIA: ICD-10-CM

## 2018-04-27 DIAGNOSIS — E04.1 NODULAR THYROID DISEASE: ICD-10-CM

## 2018-04-27 DIAGNOSIS — I10 ESSENTIAL HYPERTENSION: ICD-10-CM

## 2018-04-27 DIAGNOSIS — G47.33 OSA (OBSTRUCTIVE SLEEP APNEA): ICD-10-CM

## 2018-04-27 LAB
25(OH)D3+25(OH)D2 SERPL-MCNC: 38 NG/ML
ALBUMIN SERPL BCP-MCNC: 3.4 G/DL
ALP SERPL-CCNC: 78 U/L
ALT SERPL W/O P-5'-P-CCNC: 19 U/L
ANION GAP SERPL CALC-SCNC: 7 MMOL/L
AST SERPL-CCNC: 22 U/L
BASOPHILS # BLD AUTO: 0.02 K/UL
BASOPHILS NFR BLD: 0.3 %
BILIRUB SERPL-MCNC: 1 MG/DL
BUN SERPL-MCNC: 10 MG/DL
CA-I BLDV-SCNC: 1.34 MMOL/L
CALCIUM SERPL-MCNC: 9.8 MG/DL
CHLORIDE SERPL-SCNC: 108 MMOL/L
CHOLEST SERPL-MCNC: 123 MG/DL
CHOLEST/HDLC SERPL: 2.2 {RATIO}
CO2 SERPL-SCNC: 30 MMOL/L
CREAT SERPL-MCNC: 0.8 MG/DL
DIFFERENTIAL METHOD: ABNORMAL
EOSINOPHIL # BLD AUTO: 0.1 K/UL
EOSINOPHIL NFR BLD: 1.1 %
ERYTHROCYTE [DISTWIDTH] IN BLOOD BY AUTOMATED COUNT: 13.6 %
EST. GFR  (AFRICAN AMERICAN): >60 ML/MIN/1.73 M^2
EST. GFR  (NON AFRICAN AMERICAN): >60 ML/MIN/1.73 M^2
ESTIMATED AVG GLUCOSE: 97 MG/DL
GLUCOSE SERPL-MCNC: 78 MG/DL
HBA1C MFR BLD HPLC: 5 %
HCT VFR BLD AUTO: 40.4 %
HDLC SERPL-MCNC: 57 MG/DL
HDLC SERPL: 46.3 %
HGB BLD-MCNC: 12.8 G/DL
IMM GRANULOCYTES # BLD AUTO: 0.01 K/UL
IMM GRANULOCYTES NFR BLD AUTO: 0.1 %
LDLC SERPL CALC-MCNC: 46.4 MG/DL
LYMPHOCYTES # BLD AUTO: 2 K/UL
LYMPHOCYTES NFR BLD: 28.4 %
MCH RBC QN AUTO: 27.9 PG
MCHC RBC AUTO-ENTMCNC: 31.7 G/DL
MCV RBC AUTO: 88 FL
MONOCYTES # BLD AUTO: 0.5 K/UL
MONOCYTES NFR BLD: 7 %
NEUTROPHILS # BLD AUTO: 4.4 K/UL
NEUTROPHILS NFR BLD: 63.1 %
NONHDLC SERPL-MCNC: 66 MG/DL
NRBC BLD-RTO: 0 /100 WBC
PLATELET # BLD AUTO: 208 K/UL
PMV BLD AUTO: 12.9 FL
POTASSIUM SERPL-SCNC: 3.3 MMOL/L
PROT SERPL-MCNC: 7 G/DL
PTH-INTACT SERPL-MCNC: 58 PG/ML
RBC # BLD AUTO: 4.58 M/UL
SODIUM SERPL-SCNC: 145 MMOL/L
T3 SERPL-MCNC: 100 NG/DL
T4 FREE SERPL-MCNC: 0.83 NG/DL
TRIGL SERPL-MCNC: 98 MG/DL
TSH SERPL DL<=0.005 MIU/L-ACNC: 1.05 UIU/ML
URATE SERPL-MCNC: 5.2 MG/DL
WBC # BLD AUTO: 6.97 K/UL

## 2018-04-27 PROCEDURE — 82088 ASSAY OF ALDOSTERONE: CPT

## 2018-04-27 PROCEDURE — 99999 PR PBB SHADOW E&M-EST. PATIENT-LVL IV: CPT | Mod: PBBFAC,,, | Performed by: INTERNAL MEDICINE

## 2018-04-27 PROCEDURE — 83036 HEMOGLOBIN GLYCOSYLATED A1C: CPT

## 2018-04-27 PROCEDURE — 36415 COLL VENOUS BLD VENIPUNCTURE: CPT | Mod: PO

## 2018-04-27 PROCEDURE — 86316 IMMUNOASSAY TUMOR OTHER: CPT

## 2018-04-27 PROCEDURE — 84432 ASSAY OF THYROGLOBULIN: CPT

## 2018-04-27 PROCEDURE — 83970 ASSAY OF PARATHORMONE: CPT

## 2018-04-27 PROCEDURE — 82306 VITAMIN D 25 HYDROXY: CPT

## 2018-04-27 PROCEDURE — 99214 OFFICE O/P EST MOD 30 MIN: CPT | Mod: S$PBB,,, | Performed by: INTERNAL MEDICINE

## 2018-04-27 PROCEDURE — 80061 LIPID PANEL: CPT

## 2018-04-27 PROCEDURE — 84443 ASSAY THYROID STIM HORMONE: CPT

## 2018-04-27 PROCEDURE — 84480 ASSAY TRIIODOTHYRONINE (T3): CPT

## 2018-04-27 PROCEDURE — 85025 COMPLETE CBC W/AUTO DIFF WBC: CPT

## 2018-04-27 PROCEDURE — 84550 ASSAY OF BLOOD/URIC ACID: CPT

## 2018-04-27 PROCEDURE — 82330 ASSAY OF CALCIUM: CPT

## 2018-04-27 PROCEDURE — 99214 OFFICE O/P EST MOD 30 MIN: CPT | Mod: PBBFAC,PO | Performed by: INTERNAL MEDICINE

## 2018-04-27 PROCEDURE — 84439 ASSAY OF FREE THYROXINE: CPT

## 2018-04-27 PROCEDURE — 80053 COMPREHEN METABOLIC PANEL: CPT
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
## 2018-04-30 LAB
ALDOST SERPL-MCNC: 4.6 NG/DL
CALCIT SERPL-MCNC: <5 PG/ML
IODINE SERPL-MCNC: 89 NG/ML (ref 40–92)
THRYOGLOBULIN INTERPRETATION: ABNORMAL
THYROGLOB AB SERPL-ACNC: <1.8 IU/ML
THYROGLOB SERPL-MCNC: 69 NG/ML

## 2018-05-01 LAB
CGA SERPL-MCNC: 35 NG/ML (ref 0–95)
RENIN PLAS-CCNC: <0.6 NG/ML/H

## 2018-05-09 ENCOUNTER — TELEPHONE (OUTPATIENT)
Dept: FAMILY MEDICINE | Facility: CLINIC | Age: 73
End: 2018-05-09

## 2018-05-09 DIAGNOSIS — Z11.59 ENCOUNTER FOR HEPATITIS C SCREENING TEST FOR LOW RISK PATIENT: ICD-10-CM

## 2018-05-09 DIAGNOSIS — Z12.31 ENCOUNTER FOR SCREENING MAMMOGRAM FOR BREAST CANCER: Primary | ICD-10-CM

## 2018-05-09 NOTE — TELEPHONE ENCOUNTER
----- Message from Christian Padilla sent at 5/9/2018  1:17 PM CDT -----  Pt stopped by to ask if its possible  can put in order for mammogram.

## 2018-05-17 ENCOUNTER — OFFICE VISIT (OUTPATIENT)
Dept: CARDIOLOGY | Facility: CLINIC | Age: 73
End: 2018-05-17
Payer: MEDICARE

## 2018-05-17 VITALS
WEIGHT: 289.44 LBS | DIASTOLIC BLOOD PRESSURE: 71 MMHG | BODY MASS INDEX: 49.41 KG/M2 | HEART RATE: 64 BPM | OXYGEN SATURATION: 94 % | SYSTOLIC BLOOD PRESSURE: 149 MMHG | HEIGHT: 64 IN

## 2018-05-17 DIAGNOSIS — I10 HYPERTENSION, UNSPECIFIED TYPE: ICD-10-CM

## 2018-05-17 DIAGNOSIS — R94.31 ABNORMAL ECG: ICD-10-CM

## 2018-05-17 DIAGNOSIS — I51.89 DIASTOLIC DYSFUNCTION WITHOUT HEART FAILURE: ICD-10-CM

## 2018-05-17 DIAGNOSIS — E66.01 MORBID OBESITY WITH BODY MASS INDEX (BMI) OF 50.0 TO 59.9 IN ADULT: ICD-10-CM

## 2018-05-17 DIAGNOSIS — I10 ESSENTIAL HYPERTENSION: ICD-10-CM

## 2018-05-17 DIAGNOSIS — R06.09 DOE (DYSPNEA ON EXERTION): ICD-10-CM

## 2018-05-17 DIAGNOSIS — F43.9 STRESS AT HOME: ICD-10-CM

## 2018-05-17 DIAGNOSIS — I51.7 LVH (LEFT VENTRICULAR HYPERTROPHY): ICD-10-CM

## 2018-05-17 DIAGNOSIS — Z91.89 CARDIOVASCULAR RISK FACTOR: Primary | ICD-10-CM

## 2018-05-17 DIAGNOSIS — E78.00 PURE HYPERCHOLESTEROLEMIA: ICD-10-CM

## 2018-05-17 DIAGNOSIS — R60.0 PERIPHERAL EDEMA: ICD-10-CM

## 2018-05-17 DIAGNOSIS — G47.33 OSA (OBSTRUCTIVE SLEEP APNEA): ICD-10-CM

## 2018-05-17 PROCEDURE — 99215 OFFICE O/P EST HI 40 MIN: CPT | Mod: S$PBB,,, | Performed by: INTERNAL MEDICINE

## 2018-05-17 PROCEDURE — 93005 ELECTROCARDIOGRAM TRACING: CPT | Mod: PBBFAC,PO | Performed by: INTERNAL MEDICINE

## 2018-05-17 PROCEDURE — 93010 ELECTROCARDIOGRAM REPORT: CPT | Mod: S$PBB,,, | Performed by: INTERNAL MEDICINE

## 2018-05-17 PROCEDURE — 99213 OFFICE O/P EST LOW 20 MIN: CPT | Mod: PBBFAC,PO,25 | Performed by: INTERNAL MEDICINE

## 2018-05-17 PROCEDURE — 99999 PR PBB SHADOW E&M-EST. PATIENT-LVL III: CPT | Mod: PBBFAC,,, | Performed by: INTERNAL MEDICINE

## 2018-05-17 RX ORDER — ATORVASTATIN CALCIUM 40 MG/1
40 TABLET, FILM COATED ORAL DAILY
Qty: 90 TABLET | Refills: 3 | Status: SHIPPED | OUTPATIENT
Start: 2018-05-17 | End: 2019-05-17
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
## 2018-05-22 DIAGNOSIS — I10 HYPERTENSION, UNSPECIFIED TYPE: Primary | ICD-10-CM

## 2018-05-31 ENCOUNTER — OFFICE VISIT (OUTPATIENT)
Dept: FAMILY MEDICINE | Facility: CLINIC | Age: 73
End: 2018-05-31
Payer: MEDICARE

## 2018-05-31 VITALS
BODY MASS INDEX: 49.41 KG/M2 | DIASTOLIC BLOOD PRESSURE: 68 MMHG | HEART RATE: 69 BPM | WEIGHT: 289.44 LBS | HEIGHT: 64 IN | SYSTOLIC BLOOD PRESSURE: 125 MMHG

## 2018-05-31 DIAGNOSIS — Z01.419 ENCOUNTER FOR WELL WOMAN EXAM: Primary | ICD-10-CM

## 2018-05-31 PROCEDURE — 99999 PR PBB SHADOW E&M-EST. PATIENT-LVL IV: CPT | Mod: PBBFAC,,, | Performed by: NURSE PRACTITIONER

## 2018-05-31 PROCEDURE — 99214 OFFICE O/P EST MOD 30 MIN: CPT | Mod: PBBFAC,PO,25 | Performed by: NURSE PRACTITIONER

## 2018-05-31 PROCEDURE — G0101 CA SCREEN;PELVIC/BREAST EXAM: HCPCS | Mod: S$PBB,,, | Performed by: NURSE PRACTITIONER

## 2018-05-31 PROCEDURE — G0101 CA SCREEN;PELVIC/BREAST EXAM: HCPCS | Mod: PBBFAC,PO | Performed by: NURSE PRACTITIONER

## 2018-06-01 ENCOUNTER — HOSPITAL ENCOUNTER (OUTPATIENT)
Dept: RADIOLOGY | Facility: CLINIC | Age: 73
Discharge: HOME | End: 2018-06-01
Attending: FAMILY MEDICINE
Payer: MEDICARE

## 2018-06-01 DIAGNOSIS — Z12.31 ENCOUNTER FOR SCREENING MAMMOGRAM FOR BREAST CANCER: ICD-10-CM

## 2018-06-01 PROCEDURE — 77067 SCR MAMMO BI INCL CAD: CPT | Mod: 26,,, | Performed by: RADIOLOGY

## 2018-06-01 PROCEDURE — 77063 BREAST TOMOSYNTHESIS BI: CPT | Mod: 26,,, | Performed by: RADIOLOGY

## 2018-06-01 PROCEDURE — 77067 SCR MAMMO BI INCL CAD: CPT | Mod: TC,PO

## 2018-06-04 ENCOUNTER — TELEPHONE (OUTPATIENT)
Dept: FAMILY MEDICINE | Facility: CLINIC | Age: 73
End: 2018-06-04

## 2018-06-04 DIAGNOSIS — M79.641 RIGHT HAND PAIN: Primary | ICD-10-CM

## 2018-06-04 NOTE — PROGRESS NOTES
Chief Complaint   Patient presents with    Well Woman       History of Present Illness: Sarai Bashir is a 73 y.o. female that presents today 2018 for well gyn visit.  Pt presents today for well woman exam. She denies any abnormal pap smears in the past. Last pap was in  with and was normal. She states that she stopped having cycles at age 54 and was never on any hormones replacement. She is due for a mammogram and has it scheduled tomorrow. No other complaints or concerns noted.           Past Medical History:   Diagnosis Date    Allergy     Fatigue     History of Guillain-[place] syndrome     HTN (hypertension)     Hyperlipidemia     Neuromuscular disorder     Obesity     KENTRELL (obstructive sleep apnea) 2013    Sickle cell trait 8/31/15    Urinary tract infection        Past Surgical History:   Procedure Laterality Date    ADENOIDECTOMY      CHOLECYSTECTOMY      TONSILLECTOMY      TONSILLECTOMY, ADENOIDECTOMY, BILATERAL MYRINGOTOMY AND TUBES         Family History   Problem Relation Age of Onset    Cancer Mother     Melanoma Father     Urolithiasis Daughter     Melanoma Other     Breast cancer Maternal Aunt     Prostate cancer Neg Hx     Kidney cancer Neg Hx     Ovarian cancer Neg Hx     Psoriasis Neg Hx     Lupus Neg Hx     Eczema Neg Hx        Social History     Social History    Marital status:      Spouse name: N/A    Number of children: N/A    Years of education: N/A     Social History Main Topics    Smoking status: Never Smoker    Smokeless tobacco: Never Used    Alcohol use No    Drug use: No    Sexual activity: No     Other Topics Concern    None     Social History Narrative    None       OB History    Para Term  AB Living   8 4 4   1 3   SAB TAB Ectopic Multiple Live Births   1       3      # Outcome Date GA Lbr Favian/2nd Weight Sex Delivery Anes PTL Lv   8 Term            7 Term            6 Term            5 Term            4        Vag-Spont   VAHE   3       Vag-Spont   VAHE   2       Vag-Spont   VAHE   1 SAB         FD          Current Outpatient Prescriptions   Medication Sig Dispense Refill    amLODIPine (NORVASC) 5 MG tablet Take 1 tablet (5 mg total) by mouth once daily. 90 tablet 3    ascorbic acid (VITAMIN C) 500 MG tablet Take 500 mg by mouth once daily.        ASPIRIN (ASPIR-81 ORAL) Take by mouth.      atorvastatin (LIPITOR) 40 MG tablet Take 1 tablet (40 mg total) by mouth once daily. 90 tablet 3    baclofen (LIORESAL) 10 MG tablet Take 1 tablet (10 mg total) by mouth 2 (two) times daily as needed. Prn muscle spasm 60 tablet 0    BROMFENAC SODIUM (PROLENSA OPHT) Apply to eye 2 (two) times daily.      CALCIUM CARBONATE/VITAMIN D3 (VITAMIN D-3 ORAL) Take 2,000 Int'l Units by mouth.      CRANBERRY EXTRACT ORAL Take 1 capsule by mouth once daily.      cyanocobalamin (VITAMIN B-12) 500 MCG tablet 1 Tablet(s) Oral PRN Every other day.        econazole nitrate 1 % cream Apply to both soles and interdigital spaces BID 85 g 2    fish oil-omega-3 fatty acids 300-1,000 mg capsule Take 2 g by mouth once daily.      FLAXSEED ORAL Take by mouth.      folic acid (FOLVITE) 800 MCG tablet Take by mouth Daily. Every day      loteprednol (LOTEMAX) 0.5 % ophthalmic suspension 1 drop 4 (four) times daily.      metoprolol tartrate (LOPRESSOR) 50 MG tablet Take 1 tablet (50 mg total) by mouth 2 (two) times daily. 180 tablet 3    naproxen (EC-NAPROSYN) 500 MG EC tablet Take 1 tablet (500 mg total) by mouth 2 (two) times daily as needed (pain.  Take with food). 30 tablet 1    nystatin (MYCOSTATIN) cream Apply topically 2 (two) times daily. 30 g 5    oxybutynin (DITROPAN-XL) 10 MG 24 hr tablet Take 1 tablet (10 mg total) by mouth once daily. Take one tablet by mouth daily in the morning. 90 tablet 3    oxybutynin (DITROPAN-XL) 5 MG TR24 Take 1 tablet (5 mg total) by mouth once daily. Take one tablet by mouth once  "daily in the evening. 90 tablet 3    VITAMIN B COMP AND VIT C NO.6 (VITAMIN B COMP WITH VIT C NO.6 ORAL) No Sig Provided      vitamin E 400 UNIT capsule Take 400 Units by mouth once daily.         No current facility-administered medications for this visit.        Review of patient's allergies indicates:   Allergen Reactions    Actonel [risedronate]      myalgia    Fosamax [alendronate] Other (See Comments)       Review of Symptoms:  GENERAL: Denies weight gain or weight loss. Feeling well overall.   SKIN: Denies rash or lesions.   HEAD: Denies head injury or headache.   ABDOMEN: No abdominal pain, constipation, diarrhea, nausea, vomiting or rectal bleeding.   URINARY: No frequency, dysuria, hematuria, or burning on urination.    /68   Pulse 69   Ht 5' 4" (1.626 m)   Wt 131.3 kg (289 lb 7.4 oz)     Physical Exam:  APPEARANCE: Well nourished, well developed, in no acute distress.  SKIN: Normal skin turgor, no lesions.  RESPIRATORY: No retractions or use of accessory muscles.  ABDOMEN: Soft. No tenderness or masses. No hepatosplenomegaly. No hernias.  BREASTS: Symmetrical, no skin changes or visible lesions. No palpable masses, nipple discharge or adenopathy bilaterally.  PELVIC: Normal external female genitalia without lesions. Normal hair distribution. Adequate perineal body, normal urethral meatus. Urethra with no masses.  Bladder nontender. Vagina dry and atrophic; without lesions or discharge. Cervix pink and without lesions. No significant cystocele or rectocele. Bimanual exam showed uterus normal size, shape, position, mobile and nontender. Adnexa without masses or tenderness. Urethra and bladder normal.     ASSESSMENT/PLAN:  Encounter for well woman exam          Patient was counseled today on Pap guidelines, recommendation for pelvic exams, mammograms starting annually at age 40, Colonoscopy after the age of 50, Dexa Bone Scan and calcium and vitamin D supplementation in menopause and to see her " PCP for other health maintenance.       Follow-up:  RTC in 2 years for well woman exam.  RTC as needed

## 2018-06-04 NOTE — TELEPHONE ENCOUNTER
Called pt regarding results. See result encounter.     Pt is requesting referral for right hand pain and has had injections in the past. Pt saw Dr. Sullivan for these issues and is requesting another referral to see her. Informed pt I will send this message to Dr. Green and return her call to schedule. Pt verbalized understanding with no further questions.     ----- Message from Cherrie Agrwaal sent at 6/4/2018  9:01 AM CDT -----  Contact: self  [phone]  Type:  Patient Returning Call    Who Called:  Self   Who Left Message for Patient: Dr Green  Does the patient know what this is regarding?:  Patient returning the nurse phone call.  Best Call Back Number:  [phone]  Additional Information:

## 2018-06-04 NOTE — TELEPHONE ENCOUNTER
Patient informed, verbalized understanding.  She wants to know if maybe Dr. Vinson would be okay for her to see instead?

## 2018-06-07 DIAGNOSIS — M79.641 RIGHT HAND PAIN: Primary | ICD-10-CM

## 2018-06-13 ENCOUNTER — OFFICE VISIT (OUTPATIENT)
Dept: ORTHOPEDICS | Facility: CLINIC | Age: 73
End: 2018-06-13
Payer: MEDICARE

## 2018-06-13 ENCOUNTER — HOSPITAL ENCOUNTER (OUTPATIENT)
Dept: RADIOLOGY | Facility: HOSPITAL | Age: 73
Discharge: HOME | End: 2018-06-13
Attending: ORTHOPAEDIC SURGERY
Payer: MEDICARE

## 2018-06-13 VITALS
BODY MASS INDEX: 49.41 KG/M2 | WEIGHT: 289.44 LBS | HEART RATE: 69 BPM | HEIGHT: 64 IN | SYSTOLIC BLOOD PRESSURE: 133 MMHG | DIASTOLIC BLOOD PRESSURE: 79 MMHG

## 2018-06-13 DIAGNOSIS — M65.331 TRIGGER MIDDLE FINGER OF RIGHT HAND: Primary | ICD-10-CM

## 2018-06-13 DIAGNOSIS — M65.341 TRIGGER RING FINGER OF RIGHT HAND: ICD-10-CM

## 2018-06-13 DIAGNOSIS — M79.641 RIGHT HAND PAIN: ICD-10-CM

## 2018-06-13 PROCEDURE — 20550 NJX 1 TENDON SHEATH/LIGAMENT: CPT | Mod: PBBFAC,PN | Performed by: ORTHOPAEDIC SURGERY

## 2018-06-13 PROCEDURE — 99214 OFFICE O/P EST MOD 30 MIN: CPT | Mod: S$PBB,25,, | Performed by: ORTHOPAEDIC SURGERY

## 2018-06-13 PROCEDURE — 99999 PR PBB SHADOW E&M-EST. PATIENT-LVL III: CPT | Mod: PBBFAC,,, | Performed by: ORTHOPAEDIC SURGERY

## 2018-06-13 PROCEDURE — 73130 X-RAY EXAM OF HAND: CPT | Mod: TC,PN,RT

## 2018-06-13 PROCEDURE — 73130 X-RAY EXAM OF HAND: CPT | Mod: 26,RT,, | Performed by: RADIOLOGY

## 2018-06-13 PROCEDURE — 20550 NJX 1 TENDON SHEATH/LIGAMENT: CPT | Mod: 59,F7,S$PBB, | Performed by: ORTHOPAEDIC SURGERY

## 2018-06-13 PROCEDURE — 99213 OFFICE O/P EST LOW 20 MIN: CPT | Mod: PBBFAC,25,PN | Performed by: ORTHOPAEDIC SURGERY

## 2018-06-13 RX ORDER — TRIAMCINOLONE ACETONIDE 40 MG/ML
40 INJECTION, SUSPENSION INTRA-ARTICULAR; INTRAMUSCULAR
Status: DISCONTINUED | OUTPATIENT
Start: 2018-06-13 | End: 2018-06-13 | Stop reason: HOSPADM

## 2018-06-13 RX ADMIN — TRIAMCINOLONE ACETONIDE 40 MG: 40 INJECTION, SUSPENSION INTRA-ARTICULAR; INTRAMUSCULAR at 09:06

## 2018-06-13 NOTE — LETTER
June 13, 2018      Alex Green Jr., MD  [address] [facility] [address] [place] [id number]  Phone: [phone]          Patient: Sarai Bashir   MR Number: [id number]   YOB: 1945   Date of Visit: 6/13/2018       Dear Dr. Alex Green Jr.:    Thank you for referring Sarai Bashir to me for evaluation. Attached you will find relevant portions of my assessment and plan of care.    If you have questions, please do not hesitate to call me. I look forward to following Sarai Bashir along with you.    Sincerely,    Brian Vinson MD    Enclosure  CC:  No Recipients    If you would like to receive this communication electronically, please contact [email or web address] or [phone] to request more information on [employer] Link access.    For providers and/or their staff who would like to refer a patient to [facility], please contact us through our one-stop-shop provider referral line, [facility], [facility], at [phone].    If you feel you have received this communication in error or would no longer like to receive these types of communications, please e-mail [email or web address]

## 2018-06-14 NOTE — PROCEDURES
Tendon Sheath  Date/Time: 6/13/2018 9:42 PM  Performed by: SATURNINO MEADOWS  Authorized by: SATURNINO MEADOWS     Consent Done?:  Yes (Verbal)  Timeout: prior to procedure the correct patient, procedure, and site was verified    Indications:  Pain  Timeout: prior to procedure the correct patient, procedure, and site was verified    Location:  Long finger  Site:  R long MCP  Prep: patient was prepped and draped in usual sterile fashion    Approach:  Volar  Medications:  40 mg triamcinolone acetonide 40 mg/mL

## 2018-06-14 NOTE — PROCEDURES
Tendon Sheath  Date/Time: 6/13/2018 9:42 PM  Performed by: SATURNINO MEADOWS  Authorized by: SATURNINO MEADOWS     Consent Done?:  Yes (Verbal)  Timeout: prior to procedure the correct patient, procedure, and site was verified    Indications:  Pain  Timeout: prior to procedure the correct patient, procedure, and site was verified    Location:  Ring finger  Site:  R ring MCP  Prep: patient was prepped and draped in usual sterile fashion    Approach:  Volar  Medications:  40 mg triamcinolone acetonide 40 mg/mL

## 2018-06-14 NOTE — PROGRESS NOTES
Past Medical History:   Diagnosis Date    Allergy     Fatigue     History of Guillain-[place] syndrome     HTN (hypertension)     Hyperlipidemia     Neuromuscular disorder     Obesity     KENTRELL (obstructive sleep apnea) 7/9/2013    Sickle cell trait 8/31/15    Urinary tract infection        Past Surgical History:   Procedure Laterality Date    ADENOIDECTOMY      CHOLECYSTECTOMY      TONSILLECTOMY      TONSILLECTOMY, ADENOIDECTOMY, BILATERAL MYRINGOTOMY AND TUBES         Current Outpatient Prescriptions   Medication Sig    amLODIPine (NORVASC) 5 MG tablet Take 1 tablet (5 mg total) by mouth once daily.    ascorbic acid (VITAMIN C) 500 MG tablet Take 500 mg by mouth once daily.      ASPIRIN (ASPIR-81 ORAL) Take by mouth.    atorvastatin (LIPITOR) 40 MG tablet Take 1 tablet (40 mg total) by mouth once daily.    baclofen (LIORESAL) 10 MG tablet Take 1 tablet (10 mg total) by mouth 2 (two) times daily as needed. Prn muscle spasm    BROMFENAC SODIUM (PROLENSA OPHT) Apply to eye 2 (two) times daily.    CALCIUM CARBONATE/VITAMIN D3 (VITAMIN D-3 ORAL) Take 2,000 Int'l Units by mouth.    CRANBERRY EXTRACT ORAL Take 1 capsule by mouth once daily.    cyanocobalamin (VITAMIN B-12) 500 MCG tablet 1 Tablet(s) Oral PRN Every other day.      econazole nitrate 1 % cream Apply to both soles and interdigital spaces BID    fish oil-omega-3 fatty acids 300-1,000 mg capsule Take 2 g by mouth once daily.    FLAXSEED ORAL Take by mouth.    folic acid (FOLVITE) 800 MCG tablet Take by mouth Daily. Every day    loteprednol (LOTEMAX) 0.5 % ophthalmic suspension 1 drop 4 (four) times daily.    metoprolol tartrate (LOPRESSOR) 50 MG tablet Take 1 tablet (50 mg total) by mouth 2 (two) times daily.    naproxen (EC-NAPROSYN) 500 MG EC tablet Take 1 tablet (500 mg total) by mouth 2 (two) times daily as needed (pain.  Take with food).    nystatin (MYCOSTATIN) cream Apply topically 2 (two) times daily.    oxybutynin  (DITROPAN-XL) 10 MG 24 hr tablet Take 1 tablet (10 mg total) by mouth once daily. Take one tablet by mouth daily in the morning.    oxybutynin (DITROPAN-XL) 5 MG TR24 Take 1 tablet (5 mg total) by mouth once daily. Take one tablet by mouth once daily in the evening.    VITAMIN B COMP AND VIT C NO.6 (VITAMIN B COMP WITH VIT C NO.6 ORAL) No Sig Provided    vitamin E 400 UNIT capsule Take 400 Units by mouth once daily.       No current facility-administered medications for this visit.        Review of patient's allergies indicates:   Allergen Reactions    Actonel [risedronate]      myalgia    Fosamax [alendronate] Other (See Comments)       Family History   Problem Relation Age of Onset    Cancer Mother     Melanoma Father     Urolithiasis Daughter     Melanoma Other     Breast cancer Maternal Aunt     Prostate cancer Neg Hx     Kidney cancer Neg Hx     Ovarian cancer Neg Hx     Psoriasis Neg Hx     Lupus Neg Hx     Eczema Neg Hx        Social History     Social History    Marital status:      Spouse name: N/A    Number of children: N/A    Years of education: N/A     Occupational History    Not on file.     Social History Main Topics    Smoking status: Never Smoker    Smokeless tobacco: Never Used    Alcohol use No    Drug use: No    Sexual activity: No     Other Topics Concern    Not on file     Social History Narrative    No narrative on file       Chief Complaint:   Chief Complaint   Patient presents with    Right Hand - Pain       Consulting Physician: Alex Green Jr., *    History of present illness:    This is a 73 y.o. female who complains of pain and catching of two fingers on her right hand for several months. Pain 9/10 when caught. No injury.    Review of Systems:    Constitution: Denies chills, fever, and sweats.  HENT: Denies headaches or blurry vision.  Cardiovascular: Denies chest pain or irregular heart beat.  Respiratory: Denies cough or shortness of  "breath.  Gastrointestinal: Denies abdominal pain, nausea, or vomiting.  Musculoskeletal:  Denies muscle cramps.  Neurological: Denies dizziness or focal weakness.  Psychiatric/Behavioral: Normal mental status.  Hematologic/Lymphatic: Denies bleeding problem or easy bruising/bleeding.  Skin: Denies rash or suspicious lesions.    Examination:    Vital Signs:    Vitals:    06/13/18 0941   BP: 133/79   Pulse: 69   Weight: 131.3 kg (289 lb 7.4 oz)   Height: 5' 4" (1.626 m)   PainSc:   9   PainLoc: Hand       Body mass index is 49.69 kg/m².    This a well-developed, well nourished patient in no acute distress.    Alert and oriented x 3 and cooperative to examination.       Physical Exam: Right Hand Exam    Skin  Scars:   None  Rash:   None    Inspection  Erythema:  None  Bruising:  None  Swelling:  Mild  Masses:  None  Lymphadenopathy: None    Coordination:  Normal  Instability:  None    Range of Motion  Finger ROM:  Full except triggering middle and ring    Strength:  Normal   Tenderness:  A1 pulley    Pulse:   2+ radial  Capillary Refill: Normal    Sensation:  Intact          Imaging: X-rays ordered and reviewed today personally of right hand show DJD.        Assessment: Trigger middle finger of right hand  -     Tendon Sheath    Trigger ring finger of right hand  -     Tendon Sheath        Plan:  Injected. Discussed brace but she declined. PRN.      DISCLAIMER: This note may have been dictated using voice recognition software and may contain grammatical errors.     NOTE: Consult report sent to referring provider via EPIC EMR.  "

## 2018-07-09 DIAGNOSIS — I10 ESSENTIAL HYPERTENSION: ICD-10-CM

## 2018-07-09 RX ORDER — METOPROLOL TARTRATE 50 MG/1
50 TABLET ORAL 2 TIMES DAILY
Qty: 180 TABLET | Refills: 3 | Status: SHIPPED | OUTPATIENT
Start: 2018-07-09 | End: 2019-04-22 | Stop reason: SDUPTHER

## 2018-07-09 NOTE — TELEPHONE ENCOUNTER
----- Message from Celina Porter sent at 7/9/2018  2:16 PM CDT -----  Contact: self  Patient [phone] is requesting refill on Metoprolol 50mg - takes one tablet twice daily/please advise patient when sent to [facility]/please do not send to mail order       [facility] [address] [facility], [place] - [address]  [address]  [place] [address]  Phone: [phone] Fax: [phone]

## 2018-07-17 ENCOUNTER — DOCUMENTATION ONLY (OUTPATIENT)
Dept: FAMILY MEDICINE | Facility: CLINIC | Age: 73
End: 2018-07-17

## 2018-07-17 NOTE — PROGRESS NOTES
Pre-Visit Chart Review  For Appointment Scheduled on (date) 7/17/18    Health Maintenance Due   Topic Date Due    Pneumococcal (65+) (1 of 2 - PCV13) 03/23/2012

## 2018-07-19 ENCOUNTER — OFFICE VISIT (OUTPATIENT)
Dept: FAMILY MEDICINE | Facility: CLINIC | Age: 73
End: 2018-07-19
Payer: MEDICARE

## 2018-07-19 VITALS
HEIGHT: 64 IN | DIASTOLIC BLOOD PRESSURE: 72 MMHG | SYSTOLIC BLOOD PRESSURE: 134 MMHG | BODY MASS INDEX: 47.8 KG/M2 | HEART RATE: 59 BPM | TEMPERATURE: 98 F | WEIGHT: 280 LBS

## 2018-07-19 DIAGNOSIS — I51.89 DIASTOLIC DYSFUNCTION WITHOUT HEART FAILURE: ICD-10-CM

## 2018-07-19 DIAGNOSIS — Z86.69 HISTORY OF GUILLAIN-BARRE SYNDROME: ICD-10-CM

## 2018-07-19 DIAGNOSIS — I63.132 CEREBRAL INFARCTION DUE TO EMBOLISM OF LEFT CAROTID ARTERY: ICD-10-CM

## 2018-07-19 DIAGNOSIS — M80.00XS AGE-RELATED OSTEOPOROSIS WITH CURRENT PATHOLOGICAL FRACTURE, SEQUELA: ICD-10-CM

## 2018-07-19 DIAGNOSIS — I10 ESSENTIAL HYPERTENSION: Primary | ICD-10-CM

## 2018-07-19 DIAGNOSIS — E66.01 MORBID OBESITY WITH BODY MASS INDEX (BMI) OF 50.0 TO 59.9 IN ADULT: ICD-10-CM

## 2018-07-19 DIAGNOSIS — E78.00 PURE HYPERCHOLESTEROLEMIA: ICD-10-CM

## 2018-07-19 PROCEDURE — 99214 OFFICE O/P EST MOD 30 MIN: CPT | Mod: S$PBB,,, | Performed by: FAMILY MEDICINE

## 2018-07-19 PROCEDURE — 99213 OFFICE O/P EST LOW 20 MIN: CPT | Mod: PBBFAC,PO | Performed by: FAMILY MEDICINE

## 2018-07-19 PROCEDURE — 99999 PR PBB SHADOW E&M-EST. PATIENT-LVL III: CPT | Mod: PBBFAC,,, | Performed by: FAMILY MEDICINE

## 2018-07-23 NOTE — PROGRESS NOTES
Subjective:       Patient ID: Sarai Bashir is a 73 y.o. female.    Chief Complaint: Hypertension and Hyperlipidemia    Patient presents here for follow-up of hypertension, hyperlipidemia, osteoporosis, history of Guillain-Barré syndrome, diastolic dysfunction, and morbid obesity.  Her weight is stable since her last visit, meaning she has neither gained weight or lost weight.  I did discuss with her again the importance of weight loss by combining a good weight loss diet such as Setred diet with regular aerobic exercise.  She voices understanding but I think any weight loss will be slow.  I do not think she is a candidate for any bariatric intervention at this time.  As far as her hypertension, her blood pressures well controlled on her present medication and she is tolerating her medications well.  She is presently taking metoprolol and amlodipine.  As far as her hyperlipidemia, this is well controlled on her present dose of atorvastatin.  Her most recent lab work was reviewed and this showed a total cholesterol of 123, HDL 57, LDL 46, and triglycerides of 98.  Her CMP and TSH were within acceptable ranges.  She has a history of Guillain-Barré syndrome in the distant past but has not had any significant sequelae.  She does have diastolic dysfunction on echocardiogram but there is no evidence of congestive heart failure and no clinical symptoms of congestive heart failure.  She does have osteoporosis and has had a fracture in the past.  She has been prescribed Prolia in the past but has never gone for the injection.  I did emphasize to her the importance of this and the potential complications of osteoporosis.  She also does have a history of embolic stroke in 2013 and is presently taking aspirin daily.  As far as screening, she is up-to-date with all of her recommended screening exams.      Hypertension   This is a chronic problem. The problem is unchanged. The problem is controlled. Pertinent  negatives include no chest pain, headaches, palpitations or shortness of breath. Risk factors for coronary artery disease include dyslipidemia, obesity, post-menopausal state and sedentary lifestyle. Past treatments include calcium channel blockers and beta blockers. The current treatment provides moderate improvement. Compliance problems include exercise.  Hypertensive end-organ damage includes CVA. There is no history of kidney disease, CAD/MI or heart failure.   Hyperlipidemia   This is a chronic problem. The problem is controlled. Recent lipid tests were reviewed and are low. Pertinent negatives include no chest pain or shortness of breath. Current antihyperlipidemic treatment includes statins. The current treatment provides significant improvement of lipids. Compliance problems include adherence to exercise.  Risk factors for coronary artery disease include dyslipidemia, hypertension, obesity, post-menopausal and a sedentary lifestyle.     Review of Systems   Constitutional: Negative for chills, fatigue, fever and unexpected weight change.   HENT: Negative for congestion, ear pain, postnasal drip and sore throat.    Respiratory: Negative for cough and shortness of breath.    Cardiovascular: Negative for chest pain and palpitations.   Gastrointestinal: Negative for abdominal pain, diarrhea, nausea and vomiting.   Genitourinary: Negative for difficulty urinating, dysuria and flank pain.   Musculoskeletal: Positive for arthralgias. Negative for back pain.   Neurological: Negative for dizziness, light-headedness and headaches.   Psychiatric/Behavioral: Negative for sleep disturbance. The patient is not nervous/anxious.        Objective:      Physical Exam   Constitutional: She is oriented to person, place, and time.   Morbidly obese female in no distress at this time   HENT:   Head: Normocephalic and atraumatic.   Right Ear: External ear normal.   Left Ear: External ear normal.   Nose: Nose normal.   Mouth/Throat:  Oropharynx is clear and moist.   Neck: Normal range of motion. Neck supple. No thyromegaly present.   Cardiovascular: Normal rate, regular rhythm, normal heart sounds and intact distal pulses.    No murmur heard.  Pulmonary/Chest: Effort normal and breath sounds normal. She has no wheezes. She has no rales.   Musculoskeletal: Normal range of motion. She exhibits no edema or tenderness.   Lymphadenopathy:     She has no cervical adenopathy.   Neurological: She is alert and oriented to person, place, and time. She has normal reflexes. No cranial nerve deficit.   Psychiatric: She has a normal mood and affect. Her behavior is normal.   Vitals reviewed.      Assessment:       1. Essential hypertension    2. Pure hypercholesterolemia    3. History of Guillain-[place] syndrome    4. Age-related osteoporosis with current pathological fracture, sequela    5. Diastolic dysfunction without heart failure    6. Morbid obesity with body mass index (BMI) of 50.0 to 59.9 in adult, today 49.6    7. Cerebral infarction due to embolism of left carotid artery        Plan:       1.  Continue present medication as her hypertension, hyperlipidemia, and diastolic dysfunction are all stable and controlled  2.  Continue low-sodium, low-fat low-cholesterol diet but also integrate this with the [employer] diet to help with weight loss  3.  Encouraged regular exercise for weight loss and optimal cardiovascular function  4.  Patient was encouraged to consider the Prolia infusions for osteoporosis; she will think about this and get back to me if she wants to get this done.  All questions were answered.  5.  Continue aspirin daily  6.  Follow-up with me in 6 months or when necessary         Patient readiness: acceptance and barriers:none    During the course of the visit the patient was educated and counseled about the following:     Hypertension:   Dietary sodium restriction.  Regular aerobic exercise.  Follow up: 6 months and as needed.  Obesity:    General weight loss/lifestyle modification strategies discussed (elicit support from others; identify saboteurs; non-food rewards, etc).  Diet interventions: moderate (500 kCal/d) deficit diet.  Regular aerobic exercise program discussed.    Goals: Hypertension: Reduce Blood Pressure and Obesity: Reduce calorie intake and BMI    Did patient meet goals/outcomes: Yes    The following self management tools provided: blood pressure log    Patient Instructions (the written plan) was given to the patient/family.     Time spent with patient: 30 minutes    Barriers to medications present (no )    Adverse reactions to current medications (no)    Over the counter medications reviewed (Yes)

## 2018-10-11 NOTE — TELEPHONE ENCOUNTER
----- Message from Malaika Mandujano sent at 10/11/2018  8:09 AM CDT -----  Contact: Self  Patient has child protection paperwork that she needs to have filled out and she also needs to have a TB skin test done.  She wants to know when she can come drop it off, and if you can get her TB test scheduled as soon as possible.  All has to be done before two weeks is up, call back at [phone] (home)..  She can come in today!  Thanks

## 2018-10-11 NOTE — TELEPHONE ENCOUNTER
Contacted pt and she states that she needs a form filled out for [place] child protection services for her grandson, who is in custody of the state.  It is asking for a physical with medical history, emotional stability, and medications.  She also needs a TB skin test.  Pt seen 07/2018.  Does pt need an appt with you to have this paperwork filled out or can she just drop the paper off to be filled out?

## 2018-10-11 NOTE — TELEPHONE ENCOUNTER
Pt notified and voiced understanding.  Pt will drop off papers this morning and scheduled for PPD test on Monday.

## 2018-10-15 ENCOUNTER — CLINICAL SUPPORT (OUTPATIENT)
Dept: INTERNAL MEDICINE | Facility: CLINIC | Age: 73
End: 2018-10-15
Payer: MEDICARE

## 2018-10-15 DIAGNOSIS — Z11.1 SCREENING-PULMONARY TB: Primary | ICD-10-CM

## 2018-10-15 PROCEDURE — 99999 PR PBB SHADOW E&M-EST. PATIENT-LVL III: CPT | Mod: PBBFAC,,,

## 2018-10-15 PROCEDURE — 86580 TB INTRADERMAL TEST: CPT | Mod: PBBFAC,PO

## 2018-10-15 PROCEDURE — 99213 OFFICE O/P EST LOW 20 MIN: CPT | Mod: PBBFAC,PO

## 2018-10-15 NOTE — PROGRESS NOTES
Two person Identification with verbal feedback.  Orders per MAR Tuberculin skin test applied to Right ventral forearm. Explained how to read the test, measuring induration not just erythema;  Instructed to come into office in 48-72 hours for NURSE to  Read results.  Verbalizes understanding /mp

## 2018-10-18 ENCOUNTER — CLINICAL SUPPORT (OUTPATIENT)
Dept: INTERNAL MEDICINE | Facility: CLINIC | Age: 73
End: 2018-10-18
Payer: MEDICARE

## 2018-10-18 LAB
TB INDURATION - 48 HR READ: NORMAL MM
TB INDURATION - 72 HR READ: 0 MM
TB SKIN TEST - 48 HR READ: NORMAL
TB SKIN TEST - 72 HR READ: NEGATIVE

## 2018-10-18 NOTE — PROGRESS NOTES
PPD Reading Note    PPD read and results entered in [facility] [employer].  Result: 00 mm induration.  Interpretation: negative   Test read within 48-72 hours   Allergic reaction:  None    //mp       original paperwork done by Dr Green.

## 2019-04-22 ENCOUNTER — HOSPITAL ENCOUNTER (OUTPATIENT)
Dept: RADIOLOGY | Facility: CLINIC | Age: 74
Discharge: HOME | End: 2019-04-22
Attending: NURSE PRACTITIONER
Payer: MEDICARE

## 2019-04-22 ENCOUNTER — OFFICE VISIT (OUTPATIENT)
Dept: FAMILY MEDICINE | Facility: CLINIC | Age: 74
End: 2019-04-22
Payer: MEDICARE

## 2019-04-22 VITALS
HEART RATE: 65 BPM | HEIGHT: 64 IN | DIASTOLIC BLOOD PRESSURE: 70 MMHG | SYSTOLIC BLOOD PRESSURE: 126 MMHG | OXYGEN SATURATION: 95 % | BODY MASS INDEX: 46.26 KG/M2 | WEIGHT: 271 LBS | TEMPERATURE: 98 F

## 2019-04-22 DIAGNOSIS — M25.561 ACUTE PAIN OF RIGHT KNEE: ICD-10-CM

## 2019-04-22 DIAGNOSIS — I10 ESSENTIAL HYPERTENSION: Primary | ICD-10-CM

## 2019-04-22 DIAGNOSIS — E66.01 OBESITY, MORBID, BMI 40.0-49.9: ICD-10-CM

## 2019-04-22 DIAGNOSIS — N32.81 OVERACTIVE BLADDER: ICD-10-CM

## 2019-04-22 DIAGNOSIS — E78.5 HYPERLIPIDEMIA, UNSPECIFIED HYPERLIPIDEMIA TYPE: ICD-10-CM

## 2019-04-22 PROCEDURE — 99214 OFFICE O/P EST MOD 30 MIN: CPT | Mod: PBBFAC,25,PO | Performed by: NURSE PRACTITIONER

## 2019-04-22 PROCEDURE — 73562 XR KNEE ORTHO RIGHT: ICD-10-PCS | Mod: 26,RT,S$GLB, | Performed by: RADIOLOGY

## 2019-04-22 PROCEDURE — 99214 PR OFFICE/OUTPT VISIT, EST, LEVL IV, 30-39 MIN: ICD-10-PCS | Mod: S$PBB,,, | Performed by: NURSE PRACTITIONER

## 2019-04-22 PROCEDURE — 73560 XR KNEE ORTHO RIGHT: ICD-10-PCS | Mod: 26,59,RT,S$GLB | Performed by: RADIOLOGY

## 2019-04-22 PROCEDURE — 99214 OFFICE O/P EST MOD 30 MIN: CPT | Mod: S$PBB,,, | Performed by: NURSE PRACTITIONER

## 2019-04-22 PROCEDURE — 73560 X-RAY EXAM OF KNEE 1 OR 2: CPT | Mod: TC,FY,PO,RT

## 2019-04-22 PROCEDURE — 99999 PR PBB SHADOW E&M-EST. PATIENT-LVL IV: CPT | Mod: PBBFAC,,, | Performed by: NURSE PRACTITIONER

## 2019-04-22 PROCEDURE — 73562 X-RAY EXAM OF KNEE 3: CPT | Mod: 26,RT,S$GLB, | Performed by: RADIOLOGY

## 2019-04-22 PROCEDURE — 73562 X-RAY EXAM OF KNEE 3: CPT | Mod: TC,FY,PO,RT

## 2019-04-22 PROCEDURE — 73560 X-RAY EXAM OF KNEE 1 OR 2: CPT | Mod: 26,59,RT,S$GLB | Performed by: RADIOLOGY

## 2019-04-22 PROCEDURE — 99999 PR PBB SHADOW E&M-EST. PATIENT-LVL IV: ICD-10-PCS | Mod: PBBFAC,,, | Performed by: NURSE PRACTITIONER

## 2019-04-22 RX ORDER — METOPROLOL TARTRATE 50 MG/1
50 TABLET ORAL 2 TIMES DAILY
Qty: 180 TABLET | Refills: 3 | Status: SHIPPED | OUTPATIENT
Start: 2019-04-22

## 2019-04-22 RX ORDER — OXYBUTYNIN CHLORIDE 10 MG/1
10 TABLET, EXTENDED RELEASE ORAL DAILY
Qty: 90 TABLET | Refills: 3 | Status: SHIPPED | OUTPATIENT
Start: 2019-04-22 | End: 2019-07-21

## 2019-04-22 NOTE — PATIENT INSTRUCTIONS
Step-by-Step  Checking Your Blood Pressure    Date Last Reviewed: 4/27/2016  © 7339-8610 [employer]. [address] [place] [address], [place] [address]. All rights reserved. This information is not intended as a substitute for professional medical care. Always follow your healthcare professional's instructions.        Eating Heart-Healthy Foods  Eating has a big impact on your heart health. In fact, eating healthier can improve several of your heart risks at once. For instance, it helps you manage weight, cholesterol, and blood pressure. Here are ideas to help you make heart-healthy changes without giving up all the foods and flavors you love.  Getting started  · Talk with your health care provider about eating plans, such as the DASH or Mediterranean diet. You may also be referred to a dietitian.  · Change a few things at a time. Give yourself time to get used to a few eating changes before adding more.  · Work to create a tasty, healthy eating plan that you can stick to for the rest of your life.    Goals for healthy eating  Below are some tips to improve your eating habits:  · Limit saturated fats and trans fats. Saturated fats raise your levels of cholesterol, so keep these fats to a minimum. They are found in foods such as fatty meats, whole milk, cheese, and palm and coconut oils. Avoid trans fats because they lower good cholesterol as well as raise bad cholesterol. Trans fats are most often found in processed foods.  · Reduce sodium (salt) intake. Eating too much salt may increase your blood pressure. Limit your sodium intake to 2,300 milligrams (mg) per day, or less if your health care provider recommends it. Dining out less often and eating fewer processed foods are two great ways to decrease the amount of salt you consume.  · Managing calories. A calorie is a unit of energy. Your body burns calories for fuel, but if you eat more calories than your body burns, the extras are stored as fat. Your  Ice pack provided for comfort to E  site health care provider can help you create a diet plan to manage your calories. This will likely include eating healthier foods as well as exercising regularly. To help you track your progress, keep a diary to record what you eat and how often you exercise.  Choose the right foods  Aim to make these foods staples of your diet. If you have diabetes, you may have different recommendations than what is listed here:  · Fruits and vegetable provide plenty of nutrients without a lot of calories. At meals, fill half your plate with these foods. Split the other half of your plate between whole grains and lean protein.  · Whole grains are high in fiber and rich in vitamins and nutrients. Good choices include whole-wheat bread, pasta, and brown rice.  · Lean proteins give you nutrition with less fat. Good choices include fish, skinless chicken, and beans.  · Low-fat or nonfat dairy provides nutrients without a lot of fat. Try low-fat or nonfat milk, cheese, or yogurt.  · Healthy fats can be good for you in small amounts. These are unsaturated fats, such as olive oil, nuts, and fish. Try to have at least 2 servings per week of fatty fish such as salmon, sardines, mackerel, rainbow trout, and albacore tuna. These contain omega-3 fatty acids, which are good for your heart. Flaxseed is another source of a heart-healthy fat.  More on heart healthy eating    Read food labels  Healthy eating starts at the grocery store. Be sure to pay attention to food labels on packaged foods. Look for products that are high in fiber and protein, and low in saturated fat, cholesterol, and sodium. Avoid products that contain trans fat. And pay close attention to serving size. For instance, if you plan to eat two servings, double all the numbers on the label.  Prepare food right  A key part of healthy cooking is cutting down on added fat and salt. Look on the internet for lower-fat, lower-sodium recipes. Also, try these tips:  · Remove fat from meat  and skin from poultry before cooking.  · Skim fat from the surface of soups and sauces.  · Broil, boil, bake, steam, grill, and microwave food without added fats.  · Choose ingredients that spice up your food without adding calories, fat, or sodium. Try these items: horseradish, hot sauce, lemon, mustard, nonfat salad dressings, and vinegar. For salt-free herbs and spices, try basil, cilantro, cinnamon, pepper, and rosemary.  Date Last Reviewed: 6/25/2015  © 8428-9653 [employer]. [address] [place] [address], [place] [address]. All rights reserved. This information is not intended as a substitute for professional medical care. Always follow your healthcare professional's instructions.

## 2019-04-22 NOTE — PROGRESS NOTES
Subjective:       Patient ID: Sarai Bashir is a 74 y.o. female.    Chief Complaint: No chief complaint on file.    Hypertension   This is a chronic problem. The current episode started more than 1 year ago. Associated symptoms include anxiety. Pertinent negatives include no chest pain, headaches, palpitations, peripheral edema or shortness of breath. Past treatments include beta blockers and calcium channel blockers. The current treatment provides moderate improvement. There are no compliance problems.        Past Medical History:   Diagnosis Date    Allergy     Fatigue     History of Guillain-Fulton syndrome     HTN (hypertension)     Hyperlipidemia     Neuromuscular disorder     Obesity     KENTRELL (obstructive sleep apnea) 7/9/2013    Sickle cell trait 8/31/15    Urinary tract infection        Review of patient's allergies indicates:   Allergen Reactions    Actonel [risedronate]      myalgia    Fosamax [alendronate] Other (See Comments)         Current Outpatient Medications:     ascorbic acid (VITAMIN C) 500 MG tablet, Take 500 mg by mouth once daily.  , Disp: , Rfl:     ASPIRIN (ASPIR-81 ORAL), Take by mouth., Disp: , Rfl:     atorvastatin (LIPITOR) 40 MG tablet, Take 1 tablet (40 mg total) by mouth once daily., Disp: 90 tablet, Rfl: 3    baclofen (LIORESAL) 10 MG tablet, Take 1 tablet (10 mg total) by mouth 2 (two) times daily as needed. Prn muscle spasm, Disp: 60 tablet, Rfl: 0    BROMFENAC SODIUM (PROLENSA OPHT), Apply to eye 2 (two) times daily., Disp: , Rfl:     CALCIUM CARBONATE/VITAMIN D3 (VITAMIN D-3 ORAL), Take 2,000 Int'l Units by mouth., Disp: , Rfl:     CRANBERRY EXTRACT ORAL, Take 1 capsule by mouth once daily., Disp: , Rfl:     cyanocobalamin (VITAMIN B-12) 500 MCG tablet, 1 Tablet(s) Oral PRN Every other day.  , Disp: , Rfl:     econazole nitrate 1 % cream, Apply to both soles and interdigital spaces BID, Disp: 85 g, Rfl: 2    FLAXSEED ORAL, Take by mouth., Disp: , Rfl:  "    folic acid (FOLVITE) 800 MCG tablet, Take by mouth Daily. Every day, Disp: , Rfl:     loteprednol (LOTEMAX) 0.5 % ophthalmic suspension, 1 drop 4 (four) times daily., Disp: , Rfl:     metoprolol tartrate (LOPRESSOR) 50 MG tablet, Take 1 tablet (50 mg total) by mouth 2 (two) times daily., Disp: 180 tablet, Rfl: 3    naproxen (EC-NAPROSYN) 500 MG EC tablet, Take 1 tablet (500 mg total) by mouth 2 (two) times daily as needed (pain.  Take with food)., Disp: 30 tablet, Rfl: 1    nystatin (MYCOSTATIN) cream, Apply topically 2 (two) times daily., Disp: 30 g, Rfl: 5    VITAMIN B COMP AND VIT C NO.6 (VITAMIN B COMP WITH VIT C NO.6 ORAL), No Sig Provided, Disp: , Rfl:     vitamin E 400 UNIT capsule, Take 400 Units by mouth once daily.  , Disp: , Rfl:     amLODIPine (NORVASC) 5 MG tablet, Take 1 tablet (5 mg total) by mouth once daily., Disp: 90 tablet, Rfl: 3    oxybutynin (DITROPAN-XL) 10 MG 24 hr tablet, Take 1 tablet (10 mg total) by mouth once daily. Take one tablet by mouth daily in the morning., Disp: 90 tablet, Rfl: 3    Review of Systems   Constitutional: Negative for unexpected weight change.   Eyes: Negative for visual disturbance.   Respiratory: Negative for shortness of breath.    Cardiovascular: Negative for chest pain and palpitations.   Gastrointestinal: Negative for blood in stool.   Endocrine: Negative for polydipsia and polyphagia.   Musculoskeletal: Negative for arthralgias and myalgias.   Skin: Negative for rash.   Neurological: Negative for headaches.   Hematological: Does not bruise/bleed easily.   Psychiatric/Behavioral: Negative for agitation. The patient is not nervous/anxious.        Objective:      /70 (BP Location: Right arm, Patient Position: Sitting, BP Method: X-Large (Manual))   Pulse 65   Temp 98.1 °F (36.7 °C) (Oral)   Ht 5' 4" (1.626 m)   Wt 122.9 kg (271 lb)   SpO2 95%   BMI 46.52 kg/m²   Physical Exam   Constitutional: She is oriented to person, place, and time. " "She appears well-developed and well-nourished.   Eyes: Pupils are equal, round, and reactive to light. EOM are normal.   Neck: Normal range of motion.   Cardiovascular: Normal rate, regular rhythm and normal heart sounds.   Pulmonary/Chest: Effort normal and breath sounds normal.   Abdominal: Soft. Bowel sounds are normal.   Musculoskeletal: Normal range of motion.   Neurological: She is alert and oriented to person, place, and time.   Skin: Skin is warm and dry.   Psychiatric: She has a normal mood and affect. Her behavior is normal. Judgment and thought content normal.       Assessment:       1. Essential hypertension    2. Acute pain of right knee    3. Hyperlipidemia, unspecified hyperlipidemia type    4. Overactive bladder    5. Obesity, morbid, BMI 40.0-49.9       Plan       Essential hypertension  -     metoprolol tartrate (LOPRESSOR) 50 MG tablet; Take 1 tablet (50 mg total) by mouth 2 (two) times daily.  Dispense: 180 tablet; Refill:     Controlled, continue medication  BP Readings from Last 3 Encounters:   04/22/19 126/70   07/19/18 134/72   06/13/18 133/79     Acute pain of right knee  -     X-ray Knee Ortho Right; Future; Expected date: 04/22/2019    Hyperlipidemia, unspecified hyperlipidemia type   Stale, continue mediction  Overactive bladder  -     oxybutynin (DITROPAN-XL) 10 MG 24 hr tablet; Take 1 tablet (10 mg total) by mouth once daily. Take one tablet by mouth daily in the morning.  Dispense: 90 tablet; Refill: 3    Obesity, morbid, BMI 40.0-49.9  Counseled patient on his ideal body weight, health consequences of being obese and current recommendations including weekly exercise and a heart healthy diet.  Current BMI is:Estimated body mass index is 46.52 kg/m² as calculated from the following:    Height as of this encounter: 5' 4" (1.626 m).    Weight as of this encounter: 122.9 kg (271 lb)..  Patient is aware that ideal BMI < 25 or Weight in (lb) to have BMI = 25: 145.3.             Patient " readiness: acceptance and barriers:none    During the course of the visit the patient was educated and counseled about the following:     Hypertension:   Dietary sodium restriction.  Regular aerobic exercise.    Goals: Hypertension: Reduce Blood Pressure and Obesity: Reduce calorie intake and BMI    Did patient meet goals/outcomes: No    The following self management tools provided: declined    Patient Instructions (the written plan) was given to the patient/family.     Time spent with patient: 30 minutes    Barriers to medications present (no )    Adverse reactions to current medications (no)    Over the counter medications reviewed (Yes)

## 2019-04-24 DIAGNOSIS — M25.561 ACUTE PAIN OF RIGHT KNEE: Primary | ICD-10-CM

## 2019-04-24 DIAGNOSIS — M54.50 ACUTE BILATERAL LOW BACK PAIN WITHOUT SCIATICA: ICD-10-CM

## 2019-04-24 RX ORDER — NAPROXEN 500 MG/1
500 TABLET ORAL 2 TIMES DAILY PRN
Qty: 30 TABLET | Refills: 1 | Status: SHIPPED | OUTPATIENT
Start: 2019-04-24 | End: 2019-04-24

## 2019-04-24 RX ORDER — NAPROXEN 500 MG/1
500 TABLET ORAL 2 TIMES DAILY WITH MEALS
Qty: 14 TABLET | Refills: 0 | Status: SHIPPED | OUTPATIENT
Start: 2019-04-24 | End: 2019-05-01

## 2019-05-14 ENCOUNTER — OFFICE VISIT (OUTPATIENT)
Dept: DERMATOLOGY | Facility: CLINIC | Age: 74
End: 2019-05-14
Payer: MEDICARE

## 2019-05-14 VITALS — WEIGHT: 271 LBS | HEIGHT: 64 IN | BODY MASS INDEX: 46.26 KG/M2

## 2019-05-14 DIAGNOSIS — L81.9 HYPERPIGMENTATION: ICD-10-CM

## 2019-05-14 DIAGNOSIS — L30.9 ECZEMA, UNSPECIFIED TYPE: Primary | ICD-10-CM

## 2019-05-14 DIAGNOSIS — L82.1 SEBORRHEIC KERATOSIS: ICD-10-CM

## 2019-05-14 PROCEDURE — 99214 PR OFFICE/OUTPT VISIT, EST, LEVL IV, 30-39 MIN: ICD-10-PCS | Mod: S$PBB,,, | Performed by: DERMATOLOGY

## 2019-05-14 PROCEDURE — 99999 PR PBB SHADOW E&M-EST. PATIENT-LVL II: CPT | Mod: PBBFAC,,, | Performed by: DERMATOLOGY

## 2019-05-14 PROCEDURE — 99999 PR PBB SHADOW E&M-EST. PATIENT-LVL II: ICD-10-PCS | Mod: PBBFAC,,, | Performed by: DERMATOLOGY

## 2019-05-14 PROCEDURE — 99212 OFFICE O/P EST SF 10 MIN: CPT | Mod: PBBFAC,PO | Performed by: DERMATOLOGY

## 2019-05-14 PROCEDURE — 99214 OFFICE O/P EST MOD 30 MIN: CPT | Mod: S$PBB,,, | Performed by: DERMATOLOGY

## 2019-05-14 RX ORDER — FLUOCINONIDE 0.5 MG/G
CREAM TOPICAL 2 TIMES DAILY
Qty: 60 G | Refills: 0 | Status: SHIPPED | OUTPATIENT
Start: 2019-05-14

## 2019-05-14 NOTE — PATIENT INSTRUCTIONS
XEROSIS (DRY SKIN)        1. Definition    Xerosis is the term for dry skin.  We all have a natural oil coating over our skin produced by the skin oil glands.  If this oil is removed, the skin becomes dry which can lead to cracking, which can lead to inflammation.  Xerosis is usually a long-term problem that recurs often, especially in the winter.    2. Cause     Long hot baths or showers can remove our natural oil and lead to xerosis.  One should never take more than one bath or shower a day and for no longer than ten minutes.   Use of harsh soaps such as Zest, Dial, and Ivory can worsen and cause xerosis.   Cold winter weather worsens xerosis because the amount of moisture contained in cold air is much less than the amount of moisture in warm air.    3. Treatment     Treatment is intended to restore the natural oil to your skin.  Keep the skin lubricated.     Do not take more than one bath or shower a day.  Use lukewarm water, not hot.  Hot water dries out the skin.     Use a gentle moisturizing soap such as Cetaphil soap, Oil of Olay, Dove, Basis, Ivory moisture care, Restoraderm cleanser.     When toweling dry, dont rub.  Blot the skin so there is still some water left on the skin.  You should apply a moisturizing cream to all of the skin such as Cerave cream, Cetaphil cream, Restoraderm or Eucerin Original Formula cream.   Alpha hydroxyacid lotions, i.e., AmLactin, also work very well for preventing dry skin, but may burn when used on inflamed or reddened skin.     If you like to swim during the winter months, you should not use soap when getting out of the pool.  When you have finished swimming, rinse off the chlorine with cool to warm water.  If this will be the only shower of the day, then you may use Cetaphil or another mild soap to cleanse your skin.  After the shower, apply a moisturizing cream to all of the skin as above.        [address] [place] [address]/ [phone]  [phone] FAX/ [email or web address]

## 2019-05-28 ENCOUNTER — OFFICE VISIT (OUTPATIENT)
Dept: DERMATOLOGY | Facility: CLINIC | Age: 74
End: 2019-05-28
Payer: MEDICARE

## 2019-05-28 VITALS — HEIGHT: 64 IN | WEIGHT: 271 LBS | BODY MASS INDEX: 46.26 KG/M2

## 2019-05-28 DIAGNOSIS — D23.9 DERMATOFIBROMA: ICD-10-CM

## 2019-05-28 DIAGNOSIS — L30.9 ECZEMA, UNSPECIFIED TYPE: Primary | ICD-10-CM

## 2019-05-28 DIAGNOSIS — L29.9 ITCH: ICD-10-CM

## 2019-05-28 PROCEDURE — 99212 OFFICE O/P EST SF 10 MIN: CPT | Mod: PBBFAC,PO | Performed by: DERMATOLOGY

## 2019-05-28 PROCEDURE — 99999 PR PBB SHADOW E&M-EST. PATIENT-LVL II: CPT | Mod: PBBFAC,,, | Performed by: DERMATOLOGY

## 2019-05-28 PROCEDURE — 99213 PR OFFICE/OUTPT VISIT, EST, LEVL III, 20-29 MIN: ICD-10-PCS | Mod: S$PBB,,, | Performed by: DERMATOLOGY

## 2019-05-28 PROCEDURE — 99999 PR PBB SHADOW E&M-EST. PATIENT-LVL II: ICD-10-PCS | Mod: PBBFAC,,, | Performed by: DERMATOLOGY

## 2019-05-28 PROCEDURE — 99213 OFFICE O/P EST LOW 20 MIN: CPT | Mod: S$PBB,,, | Performed by: DERMATOLOGY

## 2019-05-28 NOTE — PROGRESS NOTES
Subjective:       Patient ID:  Sarai Bashir is a 74 y.o. female who presents for   Chief Complaint   Patient presents with    Eczema     Follow up     HPI  Last o/v 5/14/2019  Eczema, unspecified type  -     fluocinonide 0.05% (LIDEX) 0.05 % cream; Apply topically 2 (two) times daily. Rash areas prn.  Dispense: 60 g; Refill: 0  Discussed with patient the etiology and pathogenesis of the disease or skin lesion(s) and possible treatments and aggravators.    Good skin care regimen discussed including limiting to one bath or shower/day, using lukewarm water with mild soap and moisturizing cream to skin 1 - 2x/day. Brochure was provided and reviewed with patient.  Reviewed with patient different treatment options and associated risks.  Proper application of medications and or care for affected area(s) and condition(s) reviewed.  Instructed patient to use petroleum jelly at least daily on affected areas.  Discussed with patient to use organic coconut oil or pure shea butter at least daily for moisturization for the body and organic jojoba oil at least daily for the face.  Instructed patient to use mild soaps like glycerin bar soap for washing the face and body.  Can also consider avoiding applying on body except for armpits, groin, and soles.     Seborrheic keratosis  Discussed with patient the benign nature of these lesions and that no treatment is indicated.        Hyperpigmentation  Discussed with patient the benign nature of these lesions and that no treatment is indicated.     Discussed with the patient the risk of color scars or hyperpigmentation that could take months to resolve.       Here today for a follow up, using the lidex cream PRN daily. Looks much better.   Mainly on legs.  Also new spot of the r shoulder x few months.  No bleeding.    Review of Systems   Constitutional: Negative for fever, chills and fatigue.   HENT: Negative for congestion, sore throat and mouth sores.    Eyes: Negative for  itching, eye watering and eye irritation.   Respiratory: Negative for cough and shortness of breath.    Musculoskeletal: Negative for joint swelling and arthralgias.   Skin: Positive for dry skin. Negative for itching and rash.   Hematologic/Lymphatic: Does not bruise/bleed easily.        Objective:    Physical Exam   Constitutional: She appears well-developed and well-nourished. No distress.   Eyes: No conjunctival no injection.   Cardiovascular: There is no local extremity swelling and no dependent edema.     Neurological: She is alert and oriented to person, place, and time. She is not disoriented.   Psychiatric: She has a normal mood and affect.   Skin:   Areas Examined (abnormalities noted in diagram):   Head / Face Inspection Performed  Neck Inspection Performed  RUE Inspected  LUE Inspection Performed  RLE Inspected  LLE Inspection Performed              Diagram Legend     Erythematous scaling macule/papule c/w actinic keratosis       Vascular papule c/w angioma      Pigmented verrucoid papule/plaque c/w seborrheic keratosis      Yellow umbilicated papule c/w sebaceous hyperplasia      Irregularly shaped tan macule c/w lentigo     1-2 mm smooth white papules consistent with Milia      Movable subcutaneous cyst with punctum c/w epidermal inclusion cyst      Subcutaneous movable cyst c/w pilar cyst      Firm pink to brown papule c/w dermatofibroma      Pedunculated fleshy papule(s) c/w skin tag(s)      Evenly pigmented macule c/w junctional nevus     Mildly variegated pigmented, slightly irregular-bordered macule c/w mildly atypical nevus      Flesh colored to evenly pigmented papule c/w intradermal nevus       Pink pearly papule/plaque c/w basal cell carcinoma      Erythematous hyperkeratotic cursted plaque c/w SCC      Surgical scar with no sign of skin cancer recurrence      Open and closed comedones      Inflammatory papules and pustules      Verrucoid papule consistent consistent with wart     Erythematous  eczematous patches and plaques     Dystrophic onycholytic nail with subungual debris c/w onychomycosis     Umbilicated papule    Erythematous-base heme-crusted tan verrucoid plaque consistent with inflamed seborrheic keratosis     Erythematous Silvery Scaling Plaque c/w Psoriasis     See annotation      Assessment / Plan:        Eczema, unspecified type  Much better.  Condition is stable.  We will continue present management.  Lidex prn only.  Instructed patient to use petroleum jelly at least daily on affected areas.  Reviewed with patient different treatment options and associated risks.    Itch  Gone.  Condition is stable.  We will continue present management.  Prn lidex only.  Discussed with patient the risks of topical steroids, including, but not limited, to atrophy, rosacea, acne, glaucoma, cataracts, adrenal suppression, striae.  Reviewed with patient different treatment options and associated risks.    Dermatofibroma  Discussed with patient the benign nature of these lesions and that no treatment is indicated.    Discussed with patient the etiology and pathogenesis of the disease or skin lesion(s) and possible treatments and aggravators.               Follow up if symptoms worsen or fail to improve.

## 2019-07-02 ENCOUNTER — HOSPITAL ENCOUNTER (OUTPATIENT)
Dept: RADIOLOGY | Facility: HOSPITAL | Age: 74
Discharge: HOME | End: 2019-07-02
Attending: FAMILY MEDICINE
Payer: MEDICARE

## 2019-07-02 VITALS — HEIGHT: 64 IN | WEIGHT: 270 LBS | BODY MASS INDEX: 46.1 KG/M2

## 2019-07-02 DIAGNOSIS — Z12.39 ENCOUNTER FOR SPECIAL SCREENING EXAMINATION FOR NEOPLASM OF BREAST: ICD-10-CM

## 2019-07-02 PROCEDURE — 77067 SCR MAMMO BI INCL CAD: CPT | Mod: TC

## 2019-07-02 PROCEDURE — 77063 BREAST TOMOSYNTHESIS BI: CPT | Mod: 26,,, | Performed by: RADIOLOGY

## 2019-07-02 PROCEDURE — 77063 MAMMO DIGITAL SCREENING BILAT WITH TOMOSYNTHESIS_CAD: ICD-10-PCS | Mod: 26,,, | Performed by: RADIOLOGY

## 2019-07-02 PROCEDURE — 77067 MAMMO DIGITAL SCREENING BILAT WITH TOMOSYNTHESIS_CAD: ICD-10-PCS | Mod: 26,,, | Performed by: RADIOLOGY

## 2019-07-02 PROCEDURE — 77067 SCR MAMMO BI INCL CAD: CPT | Mod: 26,,, | Performed by: RADIOLOGY

## 2019-07-09 ENCOUNTER — TELEPHONE (OUTPATIENT)
Dept: FAMILY MEDICINE | Facility: CLINIC | Age: 74
End: 2019-07-09

## 2019-07-09 NOTE — TELEPHONE ENCOUNTER
Pt's daughter, Marilyn called requesting to speak to  regarding her mother. She stated that she would like her mother medical records, which I gave her the phone number to contact medical records. Ms. Sanders stated that her mother passed away on 07/04/19 and would like to talk to Dr. Green regarding her conditions.     She has been advised that Dr. Green is not in clinic and may not be able to respond immediately. Understanding verbalized.     Chart updated.

## 2019-07-09 NOTE — TELEPHONE ENCOUNTER
----- Message from Swati Nguyễn sent at 7/9/2019 12:42 PM CDT -----  Contact: Patient's daughter Marilyn  Type: Needs Medical Advice    Who Called:  Patient  Best Call Back Number: [phone]  Additional Information: Marilyn is calling to inform Dr. Green that her mom passed 7/4/19 and she would like to speak with him in regards to her mom's health prior to her passing because they were unaware..Please call back and advise.
